# Patient Record
Sex: FEMALE | Race: WHITE | NOT HISPANIC OR LATINO | Employment: OTHER | URBAN - NONMETROPOLITAN AREA
[De-identification: names, ages, dates, MRNs, and addresses within clinical notes are randomized per-mention and may not be internally consistent; named-entity substitution may affect disease eponyms.]

---

## 2018-08-06 ENCOUNTER — PREP FOR SURGERY (OUTPATIENT)
Dept: OTHER | Facility: HOSPITAL | Age: 78
End: 2018-08-06

## 2018-08-06 ENCOUNTER — OFFICE VISIT (OUTPATIENT)
Dept: CARDIOLOGY | Facility: CLINIC | Age: 78
End: 2018-08-06

## 2018-08-06 VITALS
WEIGHT: 117 LBS | HEART RATE: 71 BPM | SYSTOLIC BLOOD PRESSURE: 160 MMHG | OXYGEN SATURATION: 98 % | HEIGHT: 64 IN | BODY MASS INDEX: 19.97 KG/M2 | DIASTOLIC BLOOD PRESSURE: 78 MMHG

## 2018-08-06 DIAGNOSIS — I20.9 ANGINA, CLASS III (HCC): Primary | ICD-10-CM

## 2018-08-06 DIAGNOSIS — I10 ESSENTIAL HYPERTENSION: ICD-10-CM

## 2018-08-06 DIAGNOSIS — E78.2 MIXED HYPERLIPIDEMIA: ICD-10-CM

## 2018-08-06 DIAGNOSIS — I25.700 CORONARY ARTERY DISEASE INVOLVING CORONARY BYPASS GRAFT OF NATIVE HEART WITH UNSTABLE ANGINA PECTORIS (HCC): Primary | ICD-10-CM

## 2018-08-06 PROCEDURE — 93000 ELECTROCARDIOGRAM COMPLETE: CPT | Performed by: INTERNAL MEDICINE

## 2018-08-06 PROCEDURE — 99204 OFFICE O/P NEW MOD 45 MIN: CPT | Performed by: INTERNAL MEDICINE

## 2018-08-06 RX ORDER — ALPRAZOLAM 0.25 MG/1
0.25 TABLET ORAL 4 TIMES DAILY
COMMUNITY

## 2018-08-06 RX ORDER — LEVALBUTEROL TARTRATE 45 UG/1
1-2 AEROSOL, METERED ORAL EVERY 4 HOURS PRN
COMMUNITY

## 2018-08-06 RX ORDER — PRAVASTATIN SODIUM 80 MG/1
80 TABLET ORAL DAILY
COMMUNITY

## 2018-08-06 RX ORDER — ALENDRONATE SODIUM 70 MG/1
70 TABLET ORAL
Status: ON HOLD | COMMUNITY
End: 2018-08-16

## 2018-08-06 RX ORDER — GABAPENTIN 600 MG/1
600 TABLET ORAL 3 TIMES DAILY
Status: ON HOLD | COMMUNITY
End: 2018-08-16

## 2018-08-06 RX ORDER — SODIUM CHLORIDE 9 MG/ML
3 INJECTION, SOLUTION INTRAVENOUS ONCE
Status: CANCELLED | OUTPATIENT
Start: 2018-08-06 | End: 2018-08-06

## 2018-08-06 RX ORDER — NITROGLYCERIN 0.4 MG/1
0.4 TABLET SUBLINGUAL
COMMUNITY

## 2018-08-06 RX ORDER — ATORVASTATIN CALCIUM 40 MG/1
40 TABLET, FILM COATED ORAL DAILY
Status: ON HOLD | COMMUNITY
End: 2018-08-16

## 2018-08-06 RX ORDER — CLOPIDOGREL BISULFATE 75 MG/1
75 TABLET ORAL DAILY
COMMUNITY

## 2018-08-06 RX ORDER — MELATONIN
1000 DAILY
COMMUNITY

## 2018-08-06 RX ORDER — DIPHENHYDRAMINE HCL 25 MG
25 CAPSULE ORAL ONCE
Status: CANCELLED | OUTPATIENT
Start: 2018-08-06 | End: 2018-08-06

## 2018-08-06 RX ORDER — LIDOCAINE HYDROCHLORIDE 10 MG/ML
0.1 INJECTION, SOLUTION EPIDURAL; INFILTRATION; INTRACAUDAL; PERINEURAL ONCE AS NEEDED
Status: CANCELLED | OUTPATIENT
Start: 2018-08-06

## 2018-08-06 RX ORDER — ASPIRIN 81 MG/1
81 TABLET ORAL DAILY
COMMUNITY

## 2018-08-06 RX ORDER — CARVEDILOL 12.5 MG/1
12.5 TABLET ORAL 2 TIMES DAILY WITH MEALS
COMMUNITY

## 2018-08-06 NOTE — PROGRESS NOTES
Referring Provider: Michel Piper MD    Reason for Consultation: CAD    Chief complaint:   Chief Complaint   Patient presents with   • New/Old patient     referred back by Dr. Piper for evaluaiton of a abnormal stress test and the possibility of a heart cath.  pt was seen here back in 1998 -2010.   • Coronary Artery Disease     complaining of some chest that feels like an elephant sitting on chest, hands went numb and pain in the jaw.  started june 20.   • Chest Pain     as decribed above.  had a nuclear test that was abnormal.   • Fatigue     tired all the time   • Shortness of Breath     with walking       Subjective .     History of present illness:  Loraine Alegria is a 77 y.o. yo female with history of CAD, s/p CABG in 1998 followed by stent placement in 2010. Now with chest pain and abnormal stress test who presents today for further evaluation.  Chief Complaint   Patient presents with   • New/Old patient     referred back by Dr. Piper for evaluaiton of a abnormal stress test and the possibility of a heart cath.  pt was seen here back in 1998 -2010.   • Coronary Artery Disease     complaining of some chest that feels like an elephant sitting on chest, hands went numb and pain in the jaw.  started june 20.   • Chest Pain     as decribed above.  had a nuclear test that was abnormal.   • Fatigue     tired all the time   • Shortness of Breath     with walking   .    History  Past Medical History:   Diagnosis Date   • Abnormal stress test    • Anemia    • Angina at rest (CMS/HCC)    • Anxiety    • CAD (coronary artery disease)    • COPD (chronic obstructive pulmonary disease) (CMS/HCC)    • DJD (degenerative joint disease)    • YAHIR (generalized anxiety disorder)    • Hyperlipidemia    • Hypertension    • Insomnia    • Myocardial infarction    • Neuropathy    • Osteoporosis    • Vitamin B 12 deficiency    ,   Past Surgical History:   Procedure Laterality Date   • ANKLE FUSION     • CHOLECYSTECTOMY     • CORONARY  ANGIOPLASTY WITH STENT PLACEMENT     • CORONARY ARTERY BYPASS GRAFT     • KNEE SURGERY     • TONSILLECTOMY     ,   Family History   Problem Relation Age of Onset   • No Known Problems Mother    • No Known Problems Father    • No Known Problems Maternal Grandmother    • No Known Problems Maternal Grandfather    • No Known Problems Paternal Grandmother    • No Known Problems Paternal Grandfather    ,   Social History   Substance Use Topics   • Smoking status: Former Smoker     Start date: 1955     Quit date: 1998   • Smokeless tobacco: Never Used   • Alcohol use No   ,     Medications  Current Outpatient Prescriptions   Medication Sig Dispense Refill   • alendronate (FOSAMAX) 70 MG tablet Take 70 mg by mouth Every 7 (Seven) Days.     • ALPRAZolam (XANAX) 0.25 MG tablet Take 0.25 mg by mouth 3 (Three) Times a Day As Needed for Anxiety.     • aspirin 81 MG EC tablet Take 81 mg by mouth Daily.     • atorvastatin (LIPITOR) 40 MG tablet Take 40 mg by mouth Daily.     • carvedilol (COREG) 12.5 MG tablet Take 12.5 mg by mouth 2 (Two) Times a Day With Meals.     • cholecalciferol (VITAMIN D3) 1000 units tablet Take 1,000 Units by mouth Daily.     • clopidogrel (PLAVIX) 75 MG tablet Take 75 mg by mouth Daily.     • gabapentin (NEURONTIN) 600 MG tablet Take 600 mg by mouth 3 (Three) Times a Day.     • levalbuterol (XOPENEX HFA) 45 MCG/ACT inhaler Inhale 1-2 puffs Every 4 (Four) Hours As Needed for Wheezing.     • Misc Natural Products (OSTEO BI-FLEX JOINT SHIELD PO) Take  by mouth.     • nitroglycerin (NITROSTAT) 0.4 MG SL tablet Place 0.4 mg under the tongue Every 5 (Five) Minutes As Needed for Chest Pain. Take no more than 3 doses in 15 minutes.     • Omega-3 Fatty Acids (FISH OIL) 1200 MG capsule delayed-release Take  by mouth Daily.     • pravastatin (PRAVACHOL) 80 MG tablet Take 80 mg by mouth Daily.     • TRIAMTERENE PO Take 37.5 mg by mouth 3 (Three) Times a Week.       No current facility-administered medications for  "this visit.         Allergies:  Codeine; Ibuprofen; Lipitor [atorvastatin]; Sulfa antibiotics; Talwin [pentazocine]; Levaquin [levofloxacin]; Empirin [aspirin]; and Penicillins    Review of Systems  Review of Systems   HENT: Negative for nosebleeds.    Cardiovascular: Positive for chest pain and dyspnea on exertion. Negative for claudication, irregular heartbeat, leg swelling, near-syncope, orthopnea, palpitations, paroxysmal nocturnal dyspnea and syncope.   Respiratory: Negative for cough, hemoptysis and shortness of breath.    Gastrointestinal: Negative for dysphagia, hematemesis and melena.   Genitourinary: Negative for hematuria.   All other systems reviewed and are negative.      Objective     Physical Exam:  /78 (BP Location: Left arm, Patient Position: Sitting, Cuff Size: Adult)   Pulse 71   Ht 162.6 cm (64\")   Wt 53.1 kg (117 lb)   SpO2 98%   BMI 20.08 kg/m²   Physical Exam   Constitutional: She is oriented to person, place, and time. She appears well-developed and well-nourished. No distress.   HENT:   Head: Normocephalic and atraumatic.   Eyes: No scleral icterus.   Neck: Normal range of motion.   Cardiovascular: Normal rate, regular rhythm and normal heart sounds.  Exam reveals no gallop and no friction rub.    No murmur heard.  Pulmonary/Chest: Effort normal and breath sounds normal. No respiratory distress. She has no wheezes. She has no rales.   Abdominal: Soft. Bowel sounds are normal. She exhibits no distension. There is no tenderness.   Musculoskeletal: She exhibits no edema.   Neurological: She is alert and oriented to person, place, and time. No cranial nerve deficit.   Skin: Skin is warm and dry. She is not diaphoretic. No erythema.   Psychiatric: She has a normal mood and affect. Her behavior is normal.       Results Review:   I reviewed the patient's new clinical results.    ECG 12 Lead  Date/Time: 8/6/2018 11:36 AM  Performed by: YESI ESCOBEDO  Authorized by: YESI ESCOBEDO "   Comparison: compared with previous ECG   Similar to previous ECG  Rhythm: sinus rhythm  Rate: normal  ST Segments: ST segments normal  T Waves: T waves normal  QRS axis: normal  Clinical impression: abnormal ECG  Comments: Poor R wave progression            No results found for any previous visit.       Assessment/Plan   Loraine was seen today for new/old patient, coronary artery disease, chest pain, fatigue and shortness of breath.    Diagnoses and all orders for this visit:    Coronary artery disease involving coronary bypass graft of native heart with unstable angina pectoris (CMS/HCC), will schedule cath next week    Essential hypertension, normally runs low at home    Mixed hyperlipidemia, Patient's statin therapy is followed by PCP.      Other orders  -     ECG 12 Lead        Patient's Body mass index is 20.08 kg/m². BMI is within normal parameters. No follow-up required.

## 2018-08-16 ENCOUNTER — HOSPITAL ENCOUNTER (OUTPATIENT)
Facility: HOSPITAL | Age: 78
Setting detail: HOSPITAL OUTPATIENT SURGERY
Discharge: HOME | End: 2018-08-16
Attending: INTERNAL MEDICINE | Admitting: INTERNAL MEDICINE

## 2018-08-16 VITALS
BODY MASS INDEX: 19.63 KG/M2 | DIASTOLIC BLOOD PRESSURE: 59 MMHG | SYSTOLIC BLOOD PRESSURE: 97 MMHG | TEMPERATURE: 97.3 F | HEART RATE: 65 BPM | WEIGHT: 115 LBS | OXYGEN SATURATION: 99 % | RESPIRATION RATE: 13 BRPM | HEIGHT: 64 IN

## 2018-08-16 DIAGNOSIS — I20.9 ANGINA, CLASS III (HCC): ICD-10-CM

## 2018-08-16 LAB
ALBUMIN SERPL-MCNC: 4.4 G/DL (ref 3.5–5)
ALBUMIN/GLOB SERPL: 1.6 G/DL (ref 1.1–2.5)
ALP SERPL-CCNC: 66 U/L (ref 24–120)
ALT SERPL W P-5'-P-CCNC: 16 U/L (ref 0–54)
ANION GAP SERPL CALCULATED.3IONS-SCNC: 7 MMOL/L (ref 4–13)
AST SERPL-CCNC: 31 U/L (ref 7–45)
BASOPHILS # BLD AUTO: 0.02 10*3/MM3 (ref 0–0.2)
BASOPHILS NFR BLD AUTO: 0.3 % (ref 0–2)
BILIRUB SERPL-MCNC: 0.6 MG/DL (ref 0.1–1)
BUN BLD-MCNC: 22 MG/DL (ref 5–21)
BUN/CREAT SERPL: 29.7 (ref 7–25)
CALCIUM SPEC-SCNC: 9.3 MG/DL (ref 8.4–10.4)
CHLORIDE SERPL-SCNC: 102 MMOL/L (ref 98–110)
CO2 SERPL-SCNC: 33 MMOL/L (ref 24–31)
CREAT BLD-MCNC: 0.74 MG/DL (ref 0.5–1.4)
DEPRECATED RDW RBC AUTO: 43.2 FL (ref 40–54)
EOSINOPHIL # BLD AUTO: 0.07 10*3/MM3 (ref 0–0.7)
EOSINOPHIL NFR BLD AUTO: 1.2 % (ref 0–4)
ERYTHROCYTE [DISTWIDTH] IN BLOOD BY AUTOMATED COUNT: 12.7 % (ref 12–15)
GFR SERPL CREATININE-BSD FRML MDRD: 77 ML/MIN/1.73
GLOBULIN UR ELPH-MCNC: 2.8 GM/DL
GLUCOSE BLD-MCNC: 99 MG/DL (ref 70–100)
HCT VFR BLD AUTO: 43.3 % (ref 37–47)
HGB BLD-MCNC: 14.2 G/DL (ref 12–16)
IMM GRANULOCYTES # BLD: 0.01 10*3/MM3 (ref 0–0.03)
IMM GRANULOCYTES NFR BLD: 0.2 % (ref 0–5)
LYMPHOCYTES # BLD AUTO: 1.5 10*3/MM3 (ref 0.72–4.86)
LYMPHOCYTES NFR BLD AUTO: 25.2 % (ref 15–45)
MCH RBC QN AUTO: 30.4 PG (ref 28–32)
MCHC RBC AUTO-ENTMCNC: 32.8 G/DL (ref 33–36)
MCV RBC AUTO: 92.7 FL (ref 82–98)
MONOCYTES # BLD AUTO: 0.47 10*3/MM3 (ref 0.19–1.3)
MONOCYTES NFR BLD AUTO: 7.9 % (ref 4–12)
NEUTROPHILS # BLD AUTO: 3.88 10*3/MM3 (ref 1.87–8.4)
NEUTROPHILS NFR BLD AUTO: 65.2 % (ref 39–78)
NRBC BLD MANUAL-RTO: 0 /100 WBC (ref 0–0)
PLATELET # BLD AUTO: 246 10*3/MM3 (ref 130–400)
PMV BLD AUTO: 9.5 FL (ref 6–12)
POTASSIUM BLD-SCNC: 3.8 MMOL/L (ref 3.5–5.3)
PROT SERPL-MCNC: 7.2 G/DL (ref 6.3–8.7)
RBC # BLD AUTO: 4.67 10*6/MM3 (ref 4.2–5.4)
SODIUM BLD-SCNC: 142 MMOL/L (ref 135–145)
WBC NRBC COR # BLD: 5.95 10*3/MM3 (ref 4.8–10.8)

## 2018-08-16 PROCEDURE — 93459 L HRT ART/GRFT ANGIO: CPT | Performed by: INTERNAL MEDICINE

## 2018-08-16 PROCEDURE — C1760 CLOSURE DEV, VASC: HCPCS | Performed by: INTERNAL MEDICINE

## 2018-08-16 PROCEDURE — C1894 INTRO/SHEATH, NON-LASER: HCPCS | Performed by: INTERNAL MEDICINE

## 2018-08-16 PROCEDURE — 80053 COMPREHEN METABOLIC PANEL: CPT | Performed by: INTERNAL MEDICINE

## 2018-08-16 PROCEDURE — 25010000002 HEPARIN (PORCINE) PER 1000 UNITS: Performed by: INTERNAL MEDICINE

## 2018-08-16 PROCEDURE — 25010000002 IOPAMIDOL 61 % SOLUTION: Performed by: INTERNAL MEDICINE

## 2018-08-16 PROCEDURE — 85025 COMPLETE CBC W/AUTO DIFF WBC: CPT | Performed by: INTERNAL MEDICINE

## 2018-08-16 PROCEDURE — C1769 GUIDE WIRE: HCPCS | Performed by: INTERNAL MEDICINE

## 2018-08-16 PROCEDURE — 36251 INS CATH REN ART 1ST UNILAT: CPT | Performed by: INTERNAL MEDICINE

## 2018-08-16 PROCEDURE — 99152 MOD SED SAME PHYS/QHP 5/>YRS: CPT | Performed by: INTERNAL MEDICINE

## 2018-08-16 PROCEDURE — 25010000002 DIPHENHYDRAMINE PER 50 MG: Performed by: INTERNAL MEDICINE

## 2018-08-16 RX ORDER — SODIUM CHLORIDE 9 MG/ML
3 INJECTION, SOLUTION INTRAVENOUS ONCE
Status: COMPLETED | OUTPATIENT
Start: 2018-08-16 | End: 2018-08-16

## 2018-08-16 RX ORDER — LIDOCAINE HYDROCHLORIDE 20 MG/ML
INJECTION, SOLUTION INFILTRATION; PERINEURAL AS NEEDED
Status: DISCONTINUED | OUTPATIENT
Start: 2018-08-16 | End: 2018-08-16 | Stop reason: HOSPADM

## 2018-08-16 RX ORDER — DIPHENHYDRAMINE HYDROCHLORIDE 50 MG/ML
INJECTION INTRAMUSCULAR; INTRAVENOUS AS NEEDED
Status: DISCONTINUED | OUTPATIENT
Start: 2018-08-16 | End: 2018-08-16 | Stop reason: HOSPADM

## 2018-08-16 RX ORDER — DIPHENHYDRAMINE HCL 25 MG
25 CAPSULE ORAL ONCE
Status: DISCONTINUED | OUTPATIENT
Start: 2018-08-16 | End: 2018-08-16 | Stop reason: HOSPADM

## 2018-08-16 RX ORDER — HYDROCODONE BITARTRATE AND ACETAMINOPHEN 5; 325 MG/1; MG/1
1 TABLET ORAL EVERY 6 HOURS PRN
COMMUNITY
End: 2020-09-29 | Stop reason: HOSPADM

## 2018-08-16 RX ORDER — LIDOCAINE HYDROCHLORIDE 10 MG/ML
0.1 INJECTION, SOLUTION EPIDURAL; INFILTRATION; INTRACAUDAL; PERINEURAL ONCE AS NEEDED
Status: DISCONTINUED | OUTPATIENT
Start: 2018-08-16 | End: 2018-08-16 | Stop reason: HOSPADM

## 2018-08-16 RX ADMIN — SODIUM CHLORIDE 3 ML/KG/HR: 9 INJECTION, SOLUTION INTRAVENOUS at 08:03

## 2018-08-23 RX ORDER — PRAVASTATIN SODIUM 80 MG/1
80 TABLET ORAL DAILY
COMMUNITY

## 2018-08-23 RX ORDER — CALCIUM CARBONATE/VITAMIN D3 600 MG-10
TABLET ORAL
COMMUNITY

## 2018-08-28 ENCOUNTER — OFFICE VISIT (OUTPATIENT)
Dept: VASCULAR SURGERY | Age: 78
End: 2018-08-28
Payer: MEDICARE

## 2018-08-28 ENCOUNTER — HOSPITAL ENCOUNTER (OUTPATIENT)
Dept: ULTRASOUND IMAGING | Age: 78
Discharge: HOME | End: 2018-08-28
Payer: MEDICARE

## 2018-08-28 VITALS
TEMPERATURE: 97.6 F | HEART RATE: 81 BPM | DIASTOLIC BLOOD PRESSURE: 77 MMHG | OXYGEN SATURATION: 93 % | SYSTOLIC BLOOD PRESSURE: 113 MMHG

## 2018-08-28 DIAGNOSIS — I70.1 RENAL ARTERY STENOSIS (HCC): ICD-10-CM

## 2018-08-28 DIAGNOSIS — I70.1 RENAL ARTERY STENOSIS (HCC): Primary | ICD-10-CM

## 2018-08-28 PROCEDURE — G8427 DOCREV CUR MEDS BY ELIG CLIN: HCPCS | Performed by: NURSE PRACTITIONER

## 2018-08-28 PROCEDURE — 4040F PNEUMOC VAC/ADMIN/RCVD: CPT | Performed by: NURSE PRACTITIONER

## 2018-08-28 PROCEDURE — 93975 VASCULAR STUDY: CPT

## 2018-08-28 PROCEDURE — 3017F COLORECTAL CA SCREEN DOC REV: CPT | Performed by: NURSE PRACTITIONER

## 2018-08-28 PROCEDURE — 1090F PRES/ABSN URINE INCON ASSESS: CPT | Performed by: NURSE PRACTITIONER

## 2018-08-28 PROCEDURE — G8421 BMI NOT CALCULATED: HCPCS | Performed by: NURSE PRACTITIONER

## 2018-08-28 PROCEDURE — 1123F ACP DISCUSS/DSCN MKR DOCD: CPT | Performed by: NURSE PRACTITIONER

## 2018-08-28 PROCEDURE — 4004F PT TOBACCO SCREEN RCVD TLK: CPT | Performed by: NURSE PRACTITIONER

## 2018-08-28 PROCEDURE — 1101F PT FALLS ASSESS-DOCD LE1/YR: CPT | Performed by: NURSE PRACTITIONER

## 2018-08-28 PROCEDURE — 99203 OFFICE O/P NEW LOW 30 MIN: CPT | Performed by: NURSE PRACTITIONER

## 2018-08-28 PROCEDURE — G8400 PT W/DXA NO RESULTS DOC: HCPCS | Performed by: NURSE PRACTITIONER

## 2018-08-28 PROCEDURE — G8598 ASA/ANTIPLAT THER USED: HCPCS | Performed by: NURSE PRACTITIONER

## 2018-08-28 NOTE — PROGRESS NOTES
Patient Care Team:  Mickey Taylor MD as PCP - General (Cardiology)  Mickey Taylor MD as PCP - Cardiology (Cardiology)      History and Physical    She has a history of known renal artery stenosis for a duration of < 1 year. This was seen on her heart cath. She reports that she was told it was 85% blocked but that her insurance would not cover treating it and she was upset and comes here for a second opinion. She presents with unchanged hypertension. She is currently on 2 antihypertensive medications. Her current treatment includes ASA 81 mg po qd, clopidogrel 75 mg po qd. At present she reports symptoms of none. Risk factors for atherosclerosis reviewed with the patient include tobacco use, hyperlipidemia, coronary artery disease, hypertension, diabetes, peripheral occlusive disease, family history of ASO, ESRD, O2 use and COPD. Differential diagnosis for hypertension includes but is not limited to essential HTN aldosteronoma, pheochromocytoma, renal artery stenosis, aortic coarctation.      Lab Results   Component Value Date    BUN 17 01/20/2014     Lab Results   Component Value Date    CREATININE 0.8 01/20/2014         Shobha Villareal is a 78 y.o. female with the following history reviewed and recorded in [facility]:  Patient Active Problem List    Diagnosis Date Noted    Renal artery stenosis ([facility] [address] 08/31/2018    S/P laparoscopic cholecystectomy 02/18/2014    S/P hernia repair 02/18/2014     Current Outpatient Prescriptions   Medication Sig Dispense Refill    Omega 3 1200 MG CAPS Take by mouth      pravastatin (PRAVACHOL) 80 MG tablet Take 80 mg by mouth daily      alendronate (FOSAMAX) 70 MG tablet       ALPRAZolam (XANAX) 0.25 MG tablet       carvedilol (COREG) 12.5 MG tablet       clopidogrel (PLAVIX) 75 MG tablet       gabapentin (NEURONTIN) 600 MG tablet       HYDROcodone-acetaminophen (VICODIN) 5-500 MG per tablet       ibuprofen (ADVIL;MOTRIN) 800 MG tablet       simvastatin (ZOCOR) 40 significant fatigue. HENT - no significant rhinorrhea or epistaxis. No tinnitus or significant hearing loss. Eyes - no sudden vision change or amaurosis. Respiratory - no significant shortness of breath, wheezing, or stridor. No apnea, cough, or chest tightness associated with shortness of breath. Cardiovascular - no chest pain, syncope, or significant dizziness. No palpitations or significant leg swelling. No claudication. Gastrointestinal - no abdominal swelling or pain. No blood in stool. No severe constipation, diarrhea, nausea, or vomiting. Genitourinary - No difficulty urinating, dysuria, frequency, or urgency. No flank pain or hematuria. Musculoskeletal - no back pain, gait disturbance, or myalgia. Skin - no color change, rash, pallor, or new wound. Neurologic - no dizziness, facial asymmetry, or light headedness. No seizures. No speech difficulty or lateralizing weakness. Hematologic - no easy bruising or excessive bleeding. Psychiatric - no severe anxiety or nervousness. No confusion. All other review of systems are negative. Physical Exam    /77   Pulse 81   Temp 97.6 °F (36.4 °C) (Temporal)   SpO2 93%     Constitutional - well developed, well nourished. No diaphoresis or acute distress. HENT - head normocephalic. Right external ear canal appears normal.  Left external ear canal appears normal.  Septum appears midline. Eyes - conjunctiva normal.  EOMS normal.  No exudate. No icterus. Neck- ROM appears normal, no tracheal deviation. Cardiovascular - Regular rate and rhythm. Heart sounds are normal.  No murmur, rub, or gallop. Carotid pulses are 2+ to palpation bilaterally without bruit. Extremities - Radial and brachial pulses are 2+ to palpation bilaterally. Right femoral pulse: present 2+; Right popliteal pulse: absent Right DP: absent; Right PT absent; Left femoral pulse: present 2+; Left popliteal pulse: absent; Left DP: absent;  Left PT: absent No cyanosis, clubbing, or significant edema. No signs atheroembolic event. Pulmonary - effort appears normal.  No respiratory distress. Lungs - Breath sounds normal. No wheezes or rales. GI - Abdomen - soft, non tender, bowel sounds X 4 quadrants. No guarding or rebound tenderness. No distension or palpable mass. Genitourinary - deferred. Musculoskeletal - ROM appears normal.  No significant edema. Neurologic - alert and oriented X 3. Physiologic. Skin - warm, dry, and intact. No rash, erythema, or pallor. Psychiatric - mood, affect, and behavior appear normal.  Judgment and thought processes appear normal.    Cath report dictated as 85% left ostial renal artery stenosis    Options have been discussed with the patient including continued medical management. Patient has opted to proceed with continued medical management. Assessment    1. Renal artery stenosis (HCC)          Plan    Needs renal u/s -   1. . Normal sonogram of the kidneys. 2. There is some elevation of velocity ratios within the renal   arteries bilaterally. Overall the velocities are not greatly elevated   with the mid right renal artery and proximal left renal artery at   approximately 180 cm/s. This does result in elevation of the velocity   ratio with a relatively low velocity within the abdominal aorta of 40   cm/s. Given the velocity ratios and borderline elevation of peak   systolic velocity bilaterally follow-up with CT angiography should be   considered     Will get a copy of cath to look at artery before making recommendations  Continue antiplatelet therapy  Patient instructed to call with any sudden increase in blood pressure that can not be controlled or rapid change in renal function. Addendum - received films. Reviewed by Dr. Reshma Garrett and myself. Left ostial stenosis by our measurements is about 50%. Would not recommend intervention at th is point. We will repeat a renal u/s in 6 months.     Asa ec daily

## 2018-08-31 PROBLEM — I70.1 RENAL ARTERY STENOSIS (HCC): Status: ACTIVE | Noted: 2018-08-31

## 2018-09-07 ENCOUNTER — TELEPHONE (OUTPATIENT)
Dept: VASCULAR SURGERY | Age: 78
End: 2018-09-07

## 2018-09-07 NOTE — TELEPHONE ENCOUNTER
I left a vm for the pt to let her know per Po Client there was no significant blockage noted on her renal us, everything looks okay. We are still waiting for the disc from [facility]. Once it has been reviewed by the physician we will call the pt with further recommendation. Pt is aware via vm.

## 2018-09-19 ENCOUNTER — TELEPHONE (OUTPATIENT)
Dept: VASCULAR SURGERY | Age: 78
End: 2018-09-19

## 2019-04-29 ENCOUNTER — HOSPITAL ENCOUNTER (OUTPATIENT)
Dept: ULTRASOUND IMAGING | Age: 79
Discharge: HOME | End: 2019-04-29
Payer: MEDICARE

## 2019-04-29 DIAGNOSIS — I70.1 RENAL ARTERY STENOSIS (HCC): ICD-10-CM

## 2019-04-29 PROCEDURE — 93975 VASCULAR STUDY: CPT

## 2019-05-02 ENCOUNTER — TELEPHONE (OUTPATIENT)
Dept: VASCULAR SURGERY | Age: 79
End: 2019-05-02

## 2019-05-02 DIAGNOSIS — I70.1 RENAL ARTERY STENOSIS (HCC): Primary | ICD-10-CM

## 2019-05-02 NOTE — TELEPHONE ENCOUNTER
----- Message from BELGICA Rao sent at 5/1/2019  1:51 PM CDT -----  Please let her know Dr. Jacques Dick reviewed her study. He does not feel there is any change. Please ask if there are any changes in her blood pressure or her kidney function.   We should request last creatinine from Dr. Khris Cook

## 2019-05-02 NOTE — TELEPHONE ENCOUNTER
Called and spoke with MRs Jeferson Poole and provided the following information   Dr. Atilio Garay reviewed her study. He does not feel there is any change. And we will see her back in 1 year with renal u/s with doppler.  Mrs Jeferson Poole voiced understanding

## 2020-09-26 ENCOUNTER — APPOINTMENT (OUTPATIENT)
Dept: GENERAL RADIOLOGY | Facility: HOSPITAL | Age: 80
End: 2020-09-26

## 2020-09-26 ENCOUNTER — APPOINTMENT (OUTPATIENT)
Dept: CT IMAGING | Facility: HOSPITAL | Age: 80
End: 2020-09-26

## 2020-09-26 ENCOUNTER — HOSPITAL ENCOUNTER (INPATIENT)
Facility: HOSPITAL | Age: 80
LOS: 3 days | Discharge: HOME HEALTH | End: 2020-09-29
Attending: EMERGENCY MEDICINE | Admitting: INTERNAL MEDICINE

## 2020-09-26 DIAGNOSIS — Z78.9 DECREASED ACTIVITIES OF DAILY LIVING (ADL): ICD-10-CM

## 2020-09-26 DIAGNOSIS — S72.142A FRACTURE, INTERTROCHANTERIC, LEFT FEMUR, CLOSED, INITIAL ENCOUNTER (HCC): ICD-10-CM

## 2020-09-26 DIAGNOSIS — S72.145A CLOSED NONDISPLACED INTERTROCHANTERIC FRACTURE OF LEFT FEMUR, INITIAL ENCOUNTER (HCC): ICD-10-CM

## 2020-09-26 DIAGNOSIS — M25.552 ACUTE HIP PAIN, LEFT: Primary | ICD-10-CM

## 2020-09-26 DIAGNOSIS — Z74.09 IMPAIRED FUNCTIONAL MOBILITY, BALANCE, GAIT, AND ENDURANCE: ICD-10-CM

## 2020-09-26 PROBLEM — J44.9 COPD WITHOUT EXACERBATION (HCC): Status: ACTIVE | Noted: 2020-09-26

## 2020-09-26 PROBLEM — I10 BENIGN ESSENTIAL HTN: Status: ACTIVE | Noted: 2020-09-26

## 2020-09-26 PROBLEM — E78.5 HYPERLIPEMIA: Status: ACTIVE | Noted: 2020-09-26

## 2020-09-26 PROBLEM — I25.10 CAD (CORONARY ARTERY DISEASE): Status: ACTIVE | Noted: 2020-09-26

## 2020-09-26 LAB
ALBUMIN SERPL-MCNC: 4 G/DL (ref 3.5–5.2)
ALBUMIN/GLOB SERPL: 1.9 G/DL
ALP SERPL-CCNC: 91 U/L (ref 39–117)
ALT SERPL W P-5'-P-CCNC: 8 U/L (ref 1–33)
ANION GAP SERPL CALCULATED.3IONS-SCNC: 9 MMOL/L (ref 5–15)
APTT PPP: 29.2 SECONDS (ref 24.1–35)
AST SERPL-CCNC: 17 U/L (ref 1–32)
BASOPHILS # BLD AUTO: 0.02 10*3/MM3 (ref 0–0.2)
BASOPHILS NFR BLD AUTO: 0.3 % (ref 0–1.5)
BILIRUB SERPL-MCNC: 0.4 MG/DL (ref 0–1.2)
BUN SERPL-MCNC: 16 MG/DL (ref 8–23)
BUN/CREAT SERPL: 16.8 (ref 7–25)
CALCIUM SPEC-SCNC: 9.3 MG/DL (ref 8.6–10.5)
CHLORIDE SERPL-SCNC: 100 MMOL/L (ref 98–107)
CO2 SERPL-SCNC: 30 MMOL/L (ref 22–29)
CREAT SERPL-MCNC: 0.95 MG/DL (ref 0.57–1)
DEPRECATED RDW RBC AUTO: 40.4 FL (ref 37–54)
EOSINOPHIL # BLD AUTO: 0.07 10*3/MM3 (ref 0–0.4)
EOSINOPHIL NFR BLD AUTO: 1 % (ref 0.3–6.2)
ERYTHROCYTE [DISTWIDTH] IN BLOOD BY AUTOMATED COUNT: 12.2 % (ref 12.3–15.4)
GFR SERPL CREATININE-BSD FRML MDRD: 57 ML/MIN/1.73
GLOBULIN UR ELPH-MCNC: 2.1 GM/DL
GLUCOSE SERPL-MCNC: 110 MG/DL (ref 65–99)
HCT VFR BLD AUTO: 39.4 % (ref 34–46.6)
HGB BLD-MCNC: 13.6 G/DL (ref 12–15.9)
IMM GRANULOCYTES # BLD AUTO: 0.03 10*3/MM3 (ref 0–0.05)
IMM GRANULOCYTES NFR BLD AUTO: 0.4 % (ref 0–0.5)
INR PPP: 1 (ref 0.91–1.09)
LYMPHOCYTES # BLD AUTO: 1.55 10*3/MM3 (ref 0.7–3.1)
LYMPHOCYTES NFR BLD AUTO: 22.7 % (ref 19.6–45.3)
MCH RBC QN AUTO: 31.5 PG (ref 26.6–33)
MCHC RBC AUTO-ENTMCNC: 34.5 G/DL (ref 31.5–35.7)
MCV RBC AUTO: 91.2 FL (ref 79–97)
MONOCYTES # BLD AUTO: 0.51 10*3/MM3 (ref 0.1–0.9)
MONOCYTES NFR BLD AUTO: 7.5 % (ref 5–12)
NEUTROPHILS NFR BLD AUTO: 4.66 10*3/MM3 (ref 1.7–7)
NEUTROPHILS NFR BLD AUTO: 68.1 % (ref 42.7–76)
NRBC BLD AUTO-RTO: 0 /100 WBC (ref 0–0.2)
PLATELET # BLD AUTO: 225 10*3/MM3 (ref 140–450)
PMV BLD AUTO: 9.2 FL (ref 6–12)
POTASSIUM SERPL-SCNC: 3.6 MMOL/L (ref 3.5–5.2)
PROT SERPL-MCNC: 6.1 G/DL (ref 6–8.5)
PROTHROMBIN TIME: 12.8 SECONDS (ref 11.9–14.6)
RBC # BLD AUTO: 4.32 10*6/MM3 (ref 3.77–5.28)
SARS-COV-2 RDRP RESP QL NAA+PROBE: NOT DETECTED
SODIUM SERPL-SCNC: 139 MMOL/L (ref 136–145)
WBC # BLD AUTO: 6.84 10*3/MM3 (ref 3.4–10.8)

## 2020-09-26 PROCEDURE — 85610 PROTHROMBIN TIME: CPT | Performed by: EMERGENCY MEDICINE

## 2020-09-26 PROCEDURE — 85730 THROMBOPLASTIN TIME PARTIAL: CPT | Performed by: EMERGENCY MEDICINE

## 2020-09-26 PROCEDURE — 72125 CT NECK SPINE W/O DYE: CPT

## 2020-09-26 PROCEDURE — 85025 COMPLETE CBC W/AUTO DIFF WBC: CPT | Performed by: EMERGENCY MEDICINE

## 2020-09-26 PROCEDURE — 73502 X-RAY EXAM HIP UNI 2-3 VIEWS: CPT

## 2020-09-26 PROCEDURE — 73552 X-RAY EXAM OF FEMUR 2/>: CPT

## 2020-09-26 PROCEDURE — 70450 CT HEAD/BRAIN W/O DYE: CPT

## 2020-09-26 PROCEDURE — 80053 COMPREHEN METABOLIC PANEL: CPT | Performed by: EMERGENCY MEDICINE

## 2020-09-26 PROCEDURE — 25010000002 FENTANYL CITRATE (PF) 100 MCG/2ML SOLUTION: Performed by: EMERGENCY MEDICINE

## 2020-09-26 PROCEDURE — 87635 SARS-COV-2 COVID-19 AMP PRB: CPT | Performed by: EMERGENCY MEDICINE

## 2020-09-26 PROCEDURE — 71045 X-RAY EXAM CHEST 1 VIEW: CPT

## 2020-09-26 PROCEDURE — 99284 EMERGENCY DEPT VISIT MOD MDM: CPT

## 2020-09-26 PROCEDURE — 25010000002 ONDANSETRON PER 1 MG: Performed by: INTERNAL MEDICINE

## 2020-09-26 RX ORDER — NITROGLYCERIN 0.4 MG/1
0.4 TABLET SUBLINGUAL
Status: DISCONTINUED | OUTPATIENT
Start: 2020-09-26 | End: 2020-09-29 | Stop reason: HOSPADM

## 2020-09-26 RX ORDER — LABETALOL HYDROCHLORIDE 5 MG/ML
20 INJECTION, SOLUTION INTRAVENOUS EVERY 6 HOURS PRN
Status: DISCONTINUED | OUTPATIENT
Start: 2020-09-26 | End: 2020-09-29 | Stop reason: HOSPADM

## 2020-09-26 RX ORDER — SODIUM CHLORIDE 0.9 % (FLUSH) 0.9 %
10 SYRINGE (ML) INJECTION AS NEEDED
Status: DISCONTINUED | OUTPATIENT
Start: 2020-09-26 | End: 2020-09-29 | Stop reason: HOSPADM

## 2020-09-26 RX ORDER — HYDROCODONE BITARTRATE AND ACETAMINOPHEN 5; 325 MG/1; MG/1
1 TABLET ORAL EVERY 4 HOURS PRN
Status: DISCONTINUED | OUTPATIENT
Start: 2020-09-26 | End: 2020-09-29 | Stop reason: HOSPADM

## 2020-09-26 RX ORDER — GABAPENTIN 300 MG/1
300 CAPSULE ORAL EVERY 8 HOURS SCHEDULED
Status: DISCONTINUED | OUTPATIENT
Start: 2020-09-26 | End: 2020-09-29 | Stop reason: HOSPADM

## 2020-09-26 RX ORDER — ASPIRIN 81 MG/1
81 TABLET ORAL DAILY
Status: DISCONTINUED | OUTPATIENT
Start: 2020-09-27 | End: 2020-09-29 | Stop reason: HOSPADM

## 2020-09-26 RX ORDER — ALPRAZOLAM 0.25 MG/1
0.25 TABLET ORAL 3 TIMES DAILY PRN
Status: DISCONTINUED | OUTPATIENT
Start: 2020-09-26 | End: 2020-09-29 | Stop reason: HOSPADM

## 2020-09-26 RX ORDER — GABAPENTIN 600 MG/1
600 TABLET ORAL 3 TIMES DAILY
Status: ON HOLD | COMMUNITY
End: 2020-09-29 | Stop reason: SDUPTHER

## 2020-09-26 RX ORDER — CLOPIDOGREL BISULFATE 75 MG/1
75 TABLET ORAL DAILY
Status: DISCONTINUED | OUTPATIENT
Start: 2020-09-28 | End: 2020-09-29 | Stop reason: HOSPADM

## 2020-09-26 RX ORDER — CARVEDILOL 6.25 MG/1
12.5 TABLET ORAL 2 TIMES DAILY WITH MEALS
Status: DISCONTINUED | OUTPATIENT
Start: 2020-09-26 | End: 2020-09-29 | Stop reason: HOSPADM

## 2020-09-26 RX ORDER — TRIAMTERENE AND HYDROCHLOROTHIAZIDE 37.5; 25 MG/1; MG/1
1 TABLET ORAL DAILY
COMMUNITY
End: 2020-09-29 | Stop reason: HOSPADM

## 2020-09-26 RX ORDER — ONDANSETRON 2 MG/ML
4 INJECTION INTRAMUSCULAR; INTRAVENOUS EVERY 6 HOURS PRN
Status: DISCONTINUED | OUTPATIENT
Start: 2020-09-26 | End: 2020-09-29 | Stop reason: HOSPADM

## 2020-09-26 RX ORDER — ACETAMINOPHEN 325 MG/1
650 TABLET ORAL EVERY 4 HOURS PRN
Status: DISCONTINUED | OUTPATIENT
Start: 2020-09-26 | End: 2020-09-29 | Stop reason: HOSPADM

## 2020-09-26 RX ORDER — HYDROCODONE BITARTRATE AND ACETAMINOPHEN 5; 300 MG/1; MG/1
1 TABLET ORAL EVERY 6 HOURS PRN
COMMUNITY
End: 2020-09-29 | Stop reason: HOSPADM

## 2020-09-26 RX ORDER — ALBUTEROL SULFATE 2.5 MG/3ML
2.5 SOLUTION RESPIRATORY (INHALATION) EVERY 6 HOURS PRN
Status: DISCONTINUED | OUTPATIENT
Start: 2020-09-26 | End: 2020-09-29 | Stop reason: HOSPADM

## 2020-09-26 RX ORDER — PRAVASTATIN SODIUM 20 MG
80 TABLET ORAL NIGHTLY
Status: DISCONTINUED | OUTPATIENT
Start: 2020-09-26 | End: 2020-09-29 | Stop reason: HOSPADM

## 2020-09-26 RX ORDER — FENTANYL CITRATE 50 UG/ML
25 INJECTION, SOLUTION INTRAMUSCULAR; INTRAVENOUS
Status: DISCONTINUED | OUTPATIENT
Start: 2020-09-26 | End: 2020-09-26

## 2020-09-26 RX ORDER — SODIUM CHLORIDE 0.9 % (FLUSH) 0.9 %
10 SYRINGE (ML) INJECTION EVERY 12 HOURS SCHEDULED
Status: DISCONTINUED | OUTPATIENT
Start: 2020-09-26 | End: 2020-09-29 | Stop reason: HOSPADM

## 2020-09-26 RX ORDER — FENTANYL CITRATE 50 UG/ML
1 INJECTION, SOLUTION INTRAMUSCULAR; INTRAVENOUS ONCE
Status: COMPLETED | OUTPATIENT
Start: 2020-09-26 | End: 2020-09-26

## 2020-09-26 RX ADMIN — CARVEDILOL 12.5 MG: 6.25 TABLET, FILM COATED ORAL at 17:58

## 2020-09-26 RX ADMIN — ALPRAZOLAM 0.25 MG: 0.5 TABLET ORAL at 20:33

## 2020-09-26 RX ADMIN — GABAPENTIN 300 MG: 100 CAPSULE ORAL at 17:58

## 2020-09-26 RX ADMIN — HYDROCODONE BITARTRATE AND ACETAMINOPHEN 1 TABLET: 5; 325 TABLET ORAL at 18:10

## 2020-09-26 RX ADMIN — FENTANYL CITRATE 47 MCG: 50 INJECTION, SOLUTION INTRAMUSCULAR; INTRAVENOUS at 15:02

## 2020-09-26 RX ADMIN — PRAVASTATIN SODIUM 80 MG: 20 TABLET ORAL at 20:32

## 2020-09-26 RX ADMIN — GABAPENTIN 300 MG: 100 CAPSULE ORAL at 20:33

## 2020-09-26 RX ADMIN — ONDANSETRON HYDROCHLORIDE 4 MG: 2 SOLUTION INTRAMUSCULAR; INTRAVENOUS at 17:58

## 2020-09-27 ENCOUNTER — ANESTHESIA EVENT (OUTPATIENT)
Dept: PERIOP | Facility: HOSPITAL | Age: 80
End: 2020-09-27

## 2020-09-27 ENCOUNTER — ANESTHESIA (OUTPATIENT)
Dept: PERIOP | Facility: HOSPITAL | Age: 80
End: 2020-09-27

## 2020-09-27 ENCOUNTER — APPOINTMENT (OUTPATIENT)
Dept: GENERAL RADIOLOGY | Facility: HOSPITAL | Age: 80
End: 2020-09-27

## 2020-09-27 PROBLEM — E44.0 MODERATE MALNUTRITION (HCC): Status: ACTIVE | Noted: 2020-09-27

## 2020-09-27 PROBLEM — M25.552 ACUTE HIP PAIN, LEFT: Status: RESOLVED | Noted: 2020-09-26 | Resolved: 2020-09-27

## 2020-09-27 PROCEDURE — C1713 ANCHOR/SCREW BN/BN,TIS/BN: HCPCS | Performed by: ORTHOPAEDIC SURGERY

## 2020-09-27 PROCEDURE — 25010000002 CEFAZOLIN PER 500 MG: Performed by: ORTHOPAEDIC SURGERY

## 2020-09-27 PROCEDURE — 25010000002 PHENYLEPHRINE HCL 0.8 MG/10ML SOLUTION PREFILLED SYRINGE: Performed by: NURSE ANESTHETIST, CERTIFIED REGISTERED

## 2020-09-27 PROCEDURE — 94799 UNLISTED PULMONARY SVC/PX: CPT

## 2020-09-27 PROCEDURE — 0QS736Z REPOSITION LEFT UPPER FEMUR WITH INTRAMEDULLARY INTERNAL FIXATION DEVICE, PERCUTANEOUS APPROACH: ICD-10-PCS | Performed by: ORTHOPAEDIC SURGERY

## 2020-09-27 PROCEDURE — 25010000002 PROPOFOL 10 MG/ML EMULSION: Performed by: NURSE ANESTHETIST, CERTIFIED REGISTERED

## 2020-09-27 PROCEDURE — 76000 FLUOROSCOPY <1 HR PHYS/QHP: CPT

## 2020-09-27 PROCEDURE — 25010000002 ONDANSETRON PER 1 MG: Performed by: NURSE ANESTHETIST, CERTIFIED REGISTERED

## 2020-09-27 PROCEDURE — 73502 X-RAY EXAM HIP UNI 2-3 VIEWS: CPT

## 2020-09-27 PROCEDURE — C1769 GUIDE WIRE: HCPCS | Performed by: ORTHOPAEDIC SURGERY

## 2020-09-27 PROCEDURE — 25010000002 FENTANYL CITRATE (PF) 100 MCG/2ML SOLUTION: Performed by: NURSE ANESTHETIST, CERTIFIED REGISTERED

## 2020-09-27 PROCEDURE — 25010000002 PROMETHAZINE PER 50 MG: Performed by: ORTHOPAEDIC SURGERY

## 2020-09-27 PROCEDURE — 25010000002 CEFAZOLIN PER 500 MG: Performed by: NURSE ANESTHETIST, CERTIFIED REGISTERED

## 2020-09-27 DEVICE — SCRW LK STRDRV TI 5X34MM STRL: Type: IMPLANTABLE DEVICE | Site: HIP | Status: FUNCTIONAL

## 2020-09-27 DEVICE — BLD FEM FIX HELI TFN ADV PERF 95MM STRL: Type: IMPLANTABLE DEVICE | Site: HIP | Status: FUNCTIONAL

## 2020-09-27 DEVICE — NAIL FEM TFN ADV PROX 130D SHT 11X170MM STRL: Type: IMPLANTABLE DEVICE | Site: HIP | Status: FUNCTIONAL

## 2020-09-27 RX ORDER — OXYCODONE AND ACETAMINOPHEN 7.5; 325 MG/1; MG/1
2 TABLET ORAL ONCE AS NEEDED
Status: DISCONTINUED | OUTPATIENT
Start: 2020-09-27 | End: 2020-09-27

## 2020-09-27 RX ORDER — CEFAZOLIN SODIUM 500 MG/2.2ML
INJECTION, POWDER, FOR SOLUTION INTRAMUSCULAR; INTRAVENOUS AS NEEDED
Status: DISCONTINUED | OUTPATIENT
Start: 2020-09-27 | End: 2020-09-27 | Stop reason: SURG

## 2020-09-27 RX ORDER — SODIUM CHLORIDE 9 MG/ML
INJECTION, SOLUTION INTRAVENOUS CONTINUOUS PRN
Status: DISCONTINUED | OUTPATIENT
Start: 2020-09-27 | End: 2020-09-27 | Stop reason: SURG

## 2020-09-27 RX ORDER — EPHEDRINE SULFATE 50 MG/ML
INJECTION, SOLUTION INTRAVENOUS AS NEEDED
Status: DISCONTINUED | OUTPATIENT
Start: 2020-09-27 | End: 2020-09-27 | Stop reason: SURG

## 2020-09-27 RX ORDER — OXYCODONE AND ACETAMINOPHEN 10; 325 MG/1; MG/1
1 TABLET ORAL ONCE AS NEEDED
Status: DISCONTINUED | OUTPATIENT
Start: 2020-09-27 | End: 2020-09-27

## 2020-09-27 RX ORDER — PROPOFOL 10 MG/ML
VIAL (ML) INTRAVENOUS AS NEEDED
Status: DISCONTINUED | OUTPATIENT
Start: 2020-09-27 | End: 2020-09-27 | Stop reason: SURG

## 2020-09-27 RX ORDER — FLUMAZENIL 0.1 MG/ML
0.2 INJECTION INTRAVENOUS AS NEEDED
Status: DISCONTINUED | OUTPATIENT
Start: 2020-09-27 | End: 2020-09-27 | Stop reason: HOSPADM

## 2020-09-27 RX ORDER — FENTANYL CITRATE 50 UG/ML
25 INJECTION, SOLUTION INTRAMUSCULAR; INTRAVENOUS
Status: DISCONTINUED | OUTPATIENT
Start: 2020-09-27 | End: 2020-09-27 | Stop reason: HOSPADM

## 2020-09-27 RX ORDER — NEOSTIGMINE METHYLSULFATE 5 MG/5 ML
SYRINGE (ML) INTRAVENOUS AS NEEDED
Status: DISCONTINUED | OUTPATIENT
Start: 2020-09-27 | End: 2020-09-27 | Stop reason: SURG

## 2020-09-27 RX ORDER — MAGNESIUM HYDROXIDE 1200 MG/15ML
LIQUID ORAL AS NEEDED
Status: DISCONTINUED | OUTPATIENT
Start: 2020-09-27 | End: 2020-09-27 | Stop reason: HOSPADM

## 2020-09-27 RX ORDER — MORPHINE SULFATE 2 MG/ML
2 INJECTION, SOLUTION INTRAMUSCULAR; INTRAVENOUS
Status: DISCONTINUED | OUTPATIENT
Start: 2020-09-27 | End: 2020-09-27

## 2020-09-27 RX ORDER — ONDANSETRON 2 MG/ML
INJECTION INTRAMUSCULAR; INTRAVENOUS AS NEEDED
Status: DISCONTINUED | OUTPATIENT
Start: 2020-09-27 | End: 2020-09-27 | Stop reason: SURG

## 2020-09-27 RX ORDER — PHENYLEPHRINE HCL IN 0.9% NACL 0.8MG/10ML
SYRINGE (ML) INTRAVENOUS AS NEEDED
Status: DISCONTINUED | OUTPATIENT
Start: 2020-09-27 | End: 2020-09-27 | Stop reason: SURG

## 2020-09-27 RX ORDER — ONDANSETRON 2 MG/ML
4 INJECTION INTRAMUSCULAR; INTRAVENOUS AS NEEDED
Status: DISCONTINUED | OUTPATIENT
Start: 2020-09-27 | End: 2020-09-27 | Stop reason: HOSPADM

## 2020-09-27 RX ORDER — BUPIVACAINE HCL/0.9 % NACL/PF 0.1 %
2 PLASTIC BAG, INJECTION (ML) EPIDURAL EVERY 8 HOURS
Status: COMPLETED | OUTPATIENT
Start: 2020-09-27 | End: 2020-09-28

## 2020-09-27 RX ORDER — FENTANYL CITRATE 50 UG/ML
INJECTION, SOLUTION INTRAMUSCULAR; INTRAVENOUS AS NEEDED
Status: DISCONTINUED | OUTPATIENT
Start: 2020-09-27 | End: 2020-09-27 | Stop reason: SURG

## 2020-09-27 RX ORDER — NALOXONE HCL 0.4 MG/ML
0.04 VIAL (ML) INJECTION AS NEEDED
Status: DISCONTINUED | OUTPATIENT
Start: 2020-09-27 | End: 2020-09-27 | Stop reason: HOSPADM

## 2020-09-27 RX ORDER — ROCURONIUM BROMIDE 10 MG/ML
INJECTION, SOLUTION INTRAVENOUS AS NEEDED
Status: DISCONTINUED | OUTPATIENT
Start: 2020-09-27 | End: 2020-09-27 | Stop reason: SURG

## 2020-09-27 RX ORDER — LABETALOL HYDROCHLORIDE 5 MG/ML
5 INJECTION, SOLUTION INTRAVENOUS
Status: DISCONTINUED | OUTPATIENT
Start: 2020-09-27 | End: 2020-09-27 | Stop reason: HOSPADM

## 2020-09-27 RX ADMIN — SODIUM CHLORIDE, PRESERVATIVE FREE 10 ML: 5 INJECTION INTRAVENOUS at 21:26

## 2020-09-27 RX ADMIN — HYDROCODONE BITARTRATE AND ACETAMINOPHEN 1 TABLET: 5; 325 TABLET ORAL at 21:25

## 2020-09-27 RX ADMIN — Medication 320 MCG: at 07:47

## 2020-09-27 RX ADMIN — CARVEDILOL 12.5 MG: 6.25 TABLET, FILM COATED ORAL at 17:42

## 2020-09-27 RX ADMIN — PRAVASTATIN SODIUM 80 MG: 20 TABLET ORAL at 21:25

## 2020-09-27 RX ADMIN — ACETAMINOPHEN 650 MG: 325 TABLET, FILM COATED ORAL at 03:34

## 2020-09-27 RX ADMIN — FENTANYL CITRATE 100 MCG: 50 INJECTION, SOLUTION INTRAMUSCULAR; INTRAVENOUS at 07:41

## 2020-09-27 RX ADMIN — GABAPENTIN 300 MG: 100 CAPSULE ORAL at 21:25

## 2020-09-27 RX ADMIN — ROCURONIUM BROMIDE 30 MG: 10 INJECTION INTRAVENOUS at 07:41

## 2020-09-27 RX ADMIN — CEFAZOLIN SODIUM 2 G: 10 INJECTION, POWDER, FOR SOLUTION INTRAVENOUS at 23:31

## 2020-09-27 RX ADMIN — CEFAZOLIN SODIUM 2 G: 10 INJECTION, POWDER, FOR SOLUTION INTRAVENOUS at 15:48

## 2020-09-27 RX ADMIN — Medication 160 MCG: at 07:58

## 2020-09-27 RX ADMIN — ALPRAZOLAM 0.25 MG: 0.5 TABLET ORAL at 21:25

## 2020-09-27 RX ADMIN — EPHEDRINE SULFATE 25 MG: 50 INJECTION INTRAVENOUS at 08:02

## 2020-09-27 RX ADMIN — HYDROCODONE BITARTRATE AND ACETAMINOPHEN 1 TABLET: 5; 325 TABLET ORAL at 06:13

## 2020-09-27 RX ADMIN — LIDOCAINE HYDROCHLORIDE 60 MG: 20 INJECTION, SOLUTION INTRAVENOUS at 07:41

## 2020-09-27 RX ADMIN — CEFAZOLIN 1 G: 500 INJECTION, POWDER, FOR SOLUTION INTRAMUSCULAR; INTRAVENOUS at 07:41

## 2020-09-27 RX ADMIN — HYDROCODONE BITARTRATE AND ACETAMINOPHEN 1 TABLET: 5; 325 TABLET ORAL at 00:53

## 2020-09-27 RX ADMIN — ONDANSETRON HYDROCHLORIDE 4 MG: 2 SOLUTION INTRAMUSCULAR; INTRAVENOUS at 08:20

## 2020-09-27 RX ADMIN — GLYCOPYRROLATE 0.4 MG: 0.2 INJECTION, SOLUTION INTRAMUSCULAR; INTRAVENOUS at 08:20

## 2020-09-27 RX ADMIN — PROMETHAZINE HYDROCHLORIDE 25 MG: 25 INJECTION INTRAMUSCULAR; INTRAVENOUS at 10:33

## 2020-09-27 RX ADMIN — SODIUM CHLORIDE: 9 INJECTION, SOLUTION INTRAVENOUS at 07:37

## 2020-09-27 RX ADMIN — Medication 160 MCG: at 07:52

## 2020-09-27 RX ADMIN — GABAPENTIN 300 MG: 100 CAPSULE ORAL at 14:12

## 2020-09-27 RX ADMIN — PROPOFOL 100 MG: 10 INJECTION, EMULSION INTRAVENOUS at 07:41

## 2020-09-27 RX ADMIN — Medication 3 MG: at 08:20

## 2020-09-27 RX ADMIN — ONDANSETRON HYDROCHLORIDE 4 MG: 2 SOLUTION INTRAMUSCULAR; INTRAVENOUS at 09:13

## 2020-09-27 RX ADMIN — HYDROCODONE BITARTRATE AND ACETAMINOPHEN 1 TABLET: 5; 325 TABLET ORAL at 17:42

## 2020-09-27 NOTE — ANESTHESIA PREPROCEDURE EVALUATION
Anesthesia Evaluation     Patient summary reviewed   history of anesthetic complications: PONV  NPO Solid Status: > 8 hours  NPO Liquid Status: > 8 hours           Airway   Mallampati: I  TM distance: >3 FB  Neck ROM: full  No difficulty expected  Dental    (+) edentulous    Pulmonary - normal exam   (+) COPD moderate, home oxygen, shortness of breath,   Cardiovascular - normal exam  Exercise tolerance: good (4-7 METS)    (+) hypertension well controlled less than 2 medications, past MI  >12 months, CAD, CABG >6 Months, cardiac stents Bare metal stent more than 12 months ago angina with exertion, hyperlipidemia,       Neuro/Psych  (+) psychiatric history Anxiety,     GI/Hepatic/Renal/Endo    (+)   renal disease ESRD,     Musculoskeletal     Abdominal  - normal exam   Substance History - negative use     OB/GYN          Other   arthritis,    history of cancer remission                    Anesthesia Plan    ASA 3     intravenous induction     Anesthetic plan, all risks, benefits, and alternatives have been provided, discussed and informed consent has been obtained with: patient.    Plan discussed with CRNA.

## 2020-09-27 NOTE — ANESTHESIA PROCEDURE NOTES
Airway  Urgency: elective    Date/Time: 9/27/2020 7:44 AM  Airway not difficult    General Information and Staff    Patient location during procedure: OR  CRNA: Pk Chang CRNA    Indications and Patient Condition  Indications for airway management: airway protection    Preoxygenated: yes  Mask difficulty assessment: 1 - vent by mask    Final Airway Details  Final airway type: endotracheal airway      Successful airway: ETT    Successful intubation technique: direct laryngoscopy  Endotracheal tube insertion site: oral  Blade: Shahrzad  Blade size: 3.5.  ETT size (mm): 7.5  Cormack-Lehane Classification: grade I - full view of glottis  Placement verified by: chest auscultation and capnometry   Measured from: gums  Number of attempts at approach: 1  Assessment: lips, teeth, and gum same as pre-op and atraumatic intubation

## 2020-09-27 NOTE — ANESTHESIA POSTPROCEDURE EVALUATION
"Patient: Loraine Alegria    Procedure Summary     Date: 09/27/20 Room / Location: [facility] OR  /  [facility] OR    Anesthesia Start: 0737 Anesthesia Stop: 0844    Procedure: HIP TROCHANTERIC NAILING SHORT WITH INTRAMEDULLARY HIP SCREW (Left Hip) Diagnosis: (Left Hip Fracture)    Surgeon: Edy Miguel MD Provider: Pk Chang CRNA    Anesthesia Type: Not recorded ASA Status: 3          Anesthesia Type: No value filed.    Vitals  Vitals Value Taken Time   /82 09/27/20 0916   Temp 97.2 °F (36.2 °C) 09/27/20 0915   Pulse 64 09/27/20 0920   Resp 14 09/27/20 0915   SpO2 98 % 09/27/20 0920   Vitals shown include unvalidated device data.        Post Anesthesia Care and Evaluation    PONV Status: none  Comments: Patient d/c from PACU prior to anes eval based on Dinora score.  Please see RN notes for details of d/c criteria.    Blood pressure 92/45, pulse 52, temperature 97.4 °F (36.3 °C), temperature source Axillary, resp. rate 16, height 162.6 cm (64.02\"), weight 47.2 kg (104 lb 0.9 oz), SpO2 100 %, not currently breastfeeding.          "

## 2020-09-28 LAB
ANION GAP SERPL CALCULATED.3IONS-SCNC: 8 MMOL/L (ref 5–15)
BUN SERPL-MCNC: 19 MG/DL (ref 8–23)
BUN/CREAT SERPL: 22.1 (ref 7–25)
CALCIUM SPEC-SCNC: 8.6 MG/DL (ref 8.6–10.5)
CHLORIDE SERPL-SCNC: 100 MMOL/L (ref 98–107)
CO2 SERPL-SCNC: 31 MMOL/L (ref 22–29)
CREAT SERPL-MCNC: 0.86 MG/DL (ref 0.57–1)
DEPRECATED RDW RBC AUTO: 41 FL (ref 37–54)
ERYTHROCYTE [DISTWIDTH] IN BLOOD BY AUTOMATED COUNT: 12.2 % (ref 12.3–15.4)
GFR SERPL CREATININE-BSD FRML MDRD: 64 ML/MIN/1.73
GLUCOSE SERPL-MCNC: 92 MG/DL (ref 65–99)
HCT VFR BLD AUTO: 27.8 % (ref 34–46.6)
HGB BLD-MCNC: 9.3 G/DL (ref 12–15.9)
IRON 24H UR-MRATE: 47 MCG/DL (ref 37–145)
IRON SATN MFR SERPL: 22 % (ref 20–50)
MCH RBC QN AUTO: 30.9 PG (ref 26.6–33)
MCHC RBC AUTO-ENTMCNC: 33.5 G/DL (ref 31.5–35.7)
MCV RBC AUTO: 92.4 FL (ref 79–97)
PLATELET # BLD AUTO: 163 10*3/MM3 (ref 140–450)
PMV BLD AUTO: 9.9 FL (ref 6–12)
POTASSIUM SERPL-SCNC: 4.1 MMOL/L (ref 3.5–5.2)
RBC # BLD AUTO: 3.01 10*6/MM3 (ref 3.77–5.28)
SODIUM SERPL-SCNC: 139 MMOL/L (ref 136–145)
TIBC SERPL-MCNC: 216 MCG/DL (ref 298–536)
TRANSFERRIN SERPL-MCNC: 145 MG/DL (ref 200–360)
WBC # BLD AUTO: 7.27 10*3/MM3 (ref 3.4–10.8)

## 2020-09-28 PROCEDURE — 80048 BASIC METABOLIC PNL TOTAL CA: CPT | Performed by: INTERNAL MEDICINE

## 2020-09-28 PROCEDURE — 83540 ASSAY OF IRON: CPT | Performed by: INTERNAL MEDICINE

## 2020-09-28 PROCEDURE — 97116 GAIT TRAINING THERAPY: CPT

## 2020-09-28 PROCEDURE — 97161 PT EVAL LOW COMPLEX 20 MIN: CPT

## 2020-09-28 PROCEDURE — 84466 ASSAY OF TRANSFERRIN: CPT | Performed by: INTERNAL MEDICINE

## 2020-09-28 PROCEDURE — 25010000002 ENOXAPARIN PER 10 MG: Performed by: INTERNAL MEDICINE

## 2020-09-28 PROCEDURE — 85027 COMPLETE CBC AUTOMATED: CPT | Performed by: INTERNAL MEDICINE

## 2020-09-28 PROCEDURE — 97165 OT EVAL LOW COMPLEX 30 MIN: CPT

## 2020-09-28 PROCEDURE — 97110 THERAPEUTIC EXERCISES: CPT

## 2020-09-28 RX ADMIN — HYDROCODONE BITARTRATE AND ACETAMINOPHEN 1 TABLET: 5; 325 TABLET ORAL at 11:28

## 2020-09-28 RX ADMIN — HYDROCODONE BITARTRATE AND ACETAMINOPHEN 1 TABLET: 5; 325 TABLET ORAL at 02:34

## 2020-09-28 RX ADMIN — ASPIRIN 81 MG: 81 TABLET ORAL at 09:11

## 2020-09-28 RX ADMIN — CARVEDILOL 12.5 MG: 6.25 TABLET, FILM COATED ORAL at 17:28

## 2020-09-28 RX ADMIN — SODIUM CHLORIDE, PRESERVATIVE FREE 10 ML: 5 INJECTION INTRAVENOUS at 09:11

## 2020-09-28 RX ADMIN — GABAPENTIN 300 MG: 100 CAPSULE ORAL at 13:59

## 2020-09-28 RX ADMIN — HYDROCODONE BITARTRATE AND ACETAMINOPHEN 1 TABLET: 5; 325 TABLET ORAL at 20:09

## 2020-09-28 RX ADMIN — GABAPENTIN 300 MG: 100 CAPSULE ORAL at 06:32

## 2020-09-28 RX ADMIN — HYDROCODONE BITARTRATE AND ACETAMINOPHEN 1 TABLET: 5; 325 TABLET ORAL at 06:32

## 2020-09-28 RX ADMIN — CLOPIDOGREL BISULFATE 75 MG: 75 TABLET, FILM COATED ORAL at 09:11

## 2020-09-28 RX ADMIN — ENOXAPARIN SODIUM 40 MG: 40 INJECTION SUBCUTANEOUS at 11:25

## 2020-09-28 RX ADMIN — SODIUM CHLORIDE, PRESERVATIVE FREE 10 ML: 5 INJECTION INTRAVENOUS at 22:20

## 2020-09-28 RX ADMIN — CARVEDILOL 12.5 MG: 6.25 TABLET, FILM COATED ORAL at 09:11

## 2020-09-28 RX ADMIN — PRAVASTATIN SODIUM 80 MG: 20 TABLET ORAL at 22:20

## 2020-09-28 RX ADMIN — GABAPENTIN 300 MG: 100 CAPSULE ORAL at 22:20

## 2020-09-29 VITALS
OXYGEN SATURATION: 99 % | DIASTOLIC BLOOD PRESSURE: 65 MMHG | WEIGHT: 104.06 LBS | RESPIRATION RATE: 18 BRPM | SYSTOLIC BLOOD PRESSURE: 105 MMHG | TEMPERATURE: 98.4 F | HEART RATE: 77 BPM | HEIGHT: 64 IN | BODY MASS INDEX: 17.77 KG/M2

## 2020-09-29 LAB
HCT VFR BLD AUTO: 26.9 % (ref 34–46.6)
HGB BLD-MCNC: 9 G/DL (ref 12–15.9)
HOLD SPECIMEN: NORMAL

## 2020-09-29 PROCEDURE — 97535 SELF CARE MNGMENT TRAINING: CPT

## 2020-09-29 PROCEDURE — 97116 GAIT TRAINING THERAPY: CPT

## 2020-09-29 PROCEDURE — 85014 HEMATOCRIT: CPT | Performed by: INTERNAL MEDICINE

## 2020-09-29 PROCEDURE — 25010000002 ENOXAPARIN PER 10 MG: Performed by: INTERNAL MEDICINE

## 2020-09-29 PROCEDURE — 85018 HEMOGLOBIN: CPT | Performed by: INTERNAL MEDICINE

## 2020-09-29 RX ORDER — GABAPENTIN 600 MG/1
300 TABLET ORAL 2 TIMES DAILY
Start: 2020-09-29

## 2020-09-29 RX ORDER — HYDROCODONE BITARTRATE AND ACETAMINOPHEN 5; 325 MG/1; MG/1
1 TABLET ORAL EVERY 4 HOURS PRN
Qty: 18 TABLET | Refills: 0 | Status: SHIPPED | OUTPATIENT
Start: 2020-09-29 | End: 2020-10-02

## 2020-09-29 RX ORDER — ACETAMINOPHEN 325 MG/1
650 TABLET ORAL EVERY 4 HOURS PRN
Start: 2020-09-29

## 2020-09-29 RX ADMIN — ENOXAPARIN SODIUM 30 MG: 30 INJECTION SUBCUTANEOUS at 10:00

## 2020-09-29 RX ADMIN — CLOPIDOGREL BISULFATE 75 MG: 75 TABLET, FILM COATED ORAL at 09:59

## 2020-09-29 RX ADMIN — ASPIRIN 81 MG: 81 TABLET ORAL at 09:59

## 2020-09-29 RX ADMIN — GABAPENTIN 300 MG: 100 CAPSULE ORAL at 14:48

## 2020-09-29 RX ADMIN — CARVEDILOL 12.5 MG: 6.25 TABLET, FILM COATED ORAL at 10:00

## 2020-09-29 RX ADMIN — SODIUM CHLORIDE, PRESERVATIVE FREE 10 ML: 5 INJECTION INTRAVENOUS at 10:00

## 2020-09-29 RX ADMIN — GABAPENTIN 300 MG: 100 CAPSULE ORAL at 06:12

## 2020-09-29 RX ADMIN — HYDROCODONE BITARTRATE AND ACETAMINOPHEN 1 TABLET: 5; 325 TABLET ORAL at 06:15

## 2020-09-30 ENCOUNTER — READMISSION MANAGEMENT (OUTPATIENT)
Dept: CALL CENTER | Facility: HOSPITAL | Age: 80
End: 2020-09-30

## 2020-09-30 NOTE — OUTREACH NOTE
Prep Survey      Responses   [facility] facility patient discharged from?  [place]   Is LACE score < 7 ?  No   Eligibility  Readm Mgmt   Discharge diagnosis  Closed intertrochanteric fracture of left femur    Does the patient have one of the following disease processes/diagnoses(primary or secondary)?  Total Joint Replacement   Does the patient have Home health ordered?  Yes   What is the Home health agency?   [facility]    Is there a DME ordered?  Yes   What DME was ordered?  [employer]? per [facility]    Prep survey completed?  Yes          Nat Angel RN

## 2020-10-02 ENCOUNTER — READMISSION MANAGEMENT (OUTPATIENT)
Dept: CALL CENTER | Facility: HOSPITAL | Age: 80
End: 2020-10-02

## 2020-10-02 NOTE — OUTREACH NOTE
Total Joint Week 1 Survey      Responses   [facility] patient discharged from?  [place]   Does the patient have one of the following disease processes/diagnoses(primary or secondary)?  Total Joint Replacement   Joint surgery performed?  Hip   Week 1 attempt successful?  Yes   Call start time  1236   Call end time  1243   Discharge diagnosis  Closed intertrochanteric fracture of left femur    Person spoke with today (if not patient) and relationship  daughter   Does the patient have all medications related to this admission filled (includes all antibiotics, pain medications, etc.)  Yes   Is the patient taking all medications as directed (includes completed medication regime)?  Yes   Does the patient have a follow up appointment with their surgeon?  Yes   Has the patient kept scheduled appointments due by today?  Yes   What is the Home health agency?   [facility]    What DME was ordered?  [employer]? per [facility]    Has all DME been delivered?  Yes   Psychosocial issues?  No   When is the first therapy visit scheduled (PO Day) including how many days per week   daughter is unsure   Has the patient began therapy sessions (either in the home or as an out patient)?  Yes   If the patient has started attending therapy, what post op day did they begin to attend (either in home or as an out patient)?    PT visited today for first time and plans to come back Monday   Does the patient have a wound vac in place?  No   Has the patient fallen since discharge?  No   Did the patient receive a copy of their discharge instructions?  Yes   Nursing interventions  Reviewed instructions with patient   What is the patient's perception of their functional status since discharge?  Improving   Did the patient implement home safety suggestions from pre-surgery classes if attended?  Yes   Is the patient/caregiver able to teach back the hierarchy of who to call/visit for symptoms/problems? PCP, Specialist, Home health nurse, Urgent Care, ED,  "911  Yes   Week 1 call completed?  Yes   Wrap up additional comments  Daughter reports patient is doing well. She is \"a bit emotional\" but has been in some pain and lost a pet 2 weeks ago.  She states patient has someone with her at all times.  Denies any questions at this time.           Emilia Mckenna RN  "

## 2020-10-12 ENCOUNTER — READMISSION MANAGEMENT (OUTPATIENT)
Dept: CALL CENTER | Facility: HOSPITAL | Age: 80
End: 2020-10-12

## 2020-10-12 NOTE — OUTREACH NOTE
Total Joint Week 2 Survey      Responses   [facility] patient discharged from?  [place]   Does the patient have one of the following disease processes/diagnoses(primary or secondary)?  Total Joint Replacement   Joint surgery performed?  Hip   Week 2 attempt successful?  Yes   Call start time  1158   Call end time  1209   Has the patient been back in either the hospital or Emergency Department since discharge?  No   Medication alerts for this patient  Pt is not taking pain medication. She is taking Aleve.   Has the patient kept scheduled appointments due by today?  Yes   What is the Home health agency?   [facility]    What DME was ordered?  Pt continues to use a walker.   Has the patient fallen since discharge?  No   What is the patient's perception of their functional status since discharge?  Improving   Week 2 call completed?  Yes   Wrap up additional comments  Pt is better this week. She is not taking pain medication. She is using her walker and doing well. Her daughter will be moving in with her in a couple of weeks.          Iliana Campbell RN

## 2020-10-27 ENCOUNTER — READMISSION MANAGEMENT (OUTPATIENT)
Dept: CALL CENTER | Facility: HOSPITAL | Age: 80
End: 2020-10-27

## 2020-10-27 NOTE — OUTREACH NOTE
Total Joint Month 1 Survey      Responses   [facility] facility patient discharged from?  [place]   Does the patient have one of the following disease processes/diagnoses(primary or secondary)?  Total Joint Replacement   Joint surgery performed?  Hip   Month 1 attempt successful?  No   Unsuccessful attempts  Attempt 1          Ameena Jimenez LPN

## 2020-10-30 ENCOUNTER — READMISSION MANAGEMENT (OUTPATIENT)
Dept: CALL CENTER | Facility: HOSPITAL | Age: 80
End: 2020-10-30

## 2020-10-30 NOTE — OUTREACH NOTE
Total Joint Month 1 Survey      Responses   [facility] patient discharged from?  [place]   Does the patient have one of the following disease processes/diagnoses(primary or secondary)?  Total Joint Replacement   Joint surgery performed?  Hip   Month 1 attempt successful?  Yes   Call start time  1618   Call end time  1623   Has the patient been back in either the hospital or Emergency Department since discharge?  No   Discharge diagnosis  Closed intertrochanteric fracture of left femur    Is the patient taking all medications as directed (includes completed medication regime)?  Yes   Is the patient able to teach back alternate methods of pain control?  Ice, Reposition, Correct alignment, Short, frequent activity   Has the patient kept scheduled appointments due by today?  Yes   Is the patient still receiving Home Health Services?  N/A   Is the patient still attending therapy sessions(either in the home or as an outpatient)?  No [Patient has graduated from therapy and doing well.]   Has the patient fallen since discharge?  No   What is the patient's perception of their functional status since discharge?  Improving   If the patient is a current smoker, are they able to teach back resources for cessation?  Not a smoker   Is the patient/caregiver able to teach back the hierarchy of who to call/visit for symptoms/problems? PCP, Specialist, Home health nurse, Urgent Care, ED, 911  Yes   Month 1 call completed?  Yes          Estiven Salinas RN

## 2020-12-02 ENCOUNTER — READMISSION MANAGEMENT (OUTPATIENT)
Dept: CALL CENTER | Facility: HOSPITAL | Age: 80
End: 2020-12-02

## 2020-12-02 NOTE — OUTREACH NOTE
Total Joint Month 2 Survey      Responses   [facility] patient discharged from?  [place]   Does the patient have one of the following disease processes/diagnoses(primary or secondary)?  Total Joint Replacement   Joint surgery performed?  Hip   Month 2 attempt successful?  Yes   Call start time  1540   Call end time  1556   Has the patient been back in either the hospital or Emergency Department since discharge?  No   Discharge diagnosis  Closed intertrochanteric fracture of left femur    Is the patient taking all medications as directed (includes completed medication regime)?  Yes   Has the patient kept scheduled appointments due by today?  Yes   Is the patient still receiving Home Health Services?  N/A   Has the patient fallen since discharge?  No   What is the patient's perception of their functional status since discharge?  Improving   Is the patient able to teach back signs and symptoms of infection?  Increased swelling or redness around incision (not associated with surgical edema)   Is the patient/caregiver able to teach back the hierarchy of who to call/visit for symptoms/problems? PCP, Specialist, Home health nurse, Urgent Care, ED, 911  Yes   Additional teach back comments  Pt doing well and trying to stafe safe from COVID. 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   Month 2 Call Completed?  Yes          Julee Carbajal RN

## 2021-01-05 ENCOUNTER — READMISSION MANAGEMENT (OUTPATIENT)
Dept: CALL CENTER | Facility: HOSPITAL | Age: 81
End: 2021-01-05

## 2021-01-05 NOTE — OUTREACH NOTE
Total Joint Month 3 Survey      Responses   [facility] patient discharged from?  [place]   Does the patient have one of the following disease processes/diagnoses(primary or secondary)?  Total Joint Replacement   Month 3 attempt successful?  Yes   Call start time  1349   Call end time  1353   Has the patient been back in either the hospital or Emergency Department since discharge?  No   Is the patient taking all medications as directed (includes completed medication regime)?  Yes   Has the patient kept scheduled appointments due by today?  Yes   Is the patient still receiving Home Health Services?  No   Is the patient still attending therapy sessions(either in the home or as an outpatient)?  No   Has the patient fallen since discharge?  No   What is the patient's perception of their functional status since discharge?  Improving   Graduated  Yes   Is the patient interested in additional calls from an ambulatory ?  NOTE:  applies to high risk patients requiring additional follow-up.  No   Did the patient feel the follow up calls were helpful during their recovery period?  Yes   Was the number of calls appropriate?  Yes   Wrap up additional comments  She states is doing well, any issues she is having is from old injury of ankle and knee, hip is doing well, will call us if needed          Jaky Colunga RN

## 2021-09-01 RX ORDER — TRAMADOL HYDROCHLORIDE 50 MG/1
50 TABLET ORAL 2 TIMES DAILY
COMMUNITY

## 2021-09-08 ENCOUNTER — OFFICE VISIT (OUTPATIENT)
Dept: VASCULAR SURGERY | Age: 81
End: 2021-09-08
Payer: MEDICARE

## 2021-09-08 VITALS
WEIGHT: 97 LBS | HEIGHT: 64 IN | HEART RATE: 79 BPM | DIASTOLIC BLOOD PRESSURE: 75 MMHG | SYSTOLIC BLOOD PRESSURE: 147 MMHG | BODY MASS INDEX: 16.56 KG/M2 | RESPIRATION RATE: 16 BRPM | TEMPERATURE: 97.1 F | OXYGEN SATURATION: 93 %

## 2021-09-08 DIAGNOSIS — I70.1 RENAL ARTERY STENOSIS (HCC): Primary | ICD-10-CM

## 2021-09-08 DIAGNOSIS — I65.23 BILATERAL CAROTID ARTERY STENOSIS: ICD-10-CM

## 2021-09-08 PROCEDURE — 4040F PNEUMOC VAC/ADMIN/RCVD: CPT | Performed by: NURSE PRACTITIONER

## 2021-09-08 PROCEDURE — G8400 PT W/DXA NO RESULTS DOC: HCPCS | Performed by: NURSE PRACTITIONER

## 2021-09-08 PROCEDURE — G8427 DOCREV CUR MEDS BY ELIG CLIN: HCPCS | Performed by: NURSE PRACTITIONER

## 2021-09-08 PROCEDURE — 4004F PT TOBACCO SCREEN RCVD TLK: CPT | Performed by: NURSE PRACTITIONER

## 2021-09-08 PROCEDURE — 1123F ACP DISCUSS/DSCN MKR DOCD: CPT | Performed by: NURSE PRACTITIONER

## 2021-09-08 PROCEDURE — G8419 CALC BMI OUT NRM PARAM NOF/U: HCPCS | Performed by: NURSE PRACTITIONER

## 2021-09-08 PROCEDURE — 99204 OFFICE O/P NEW MOD 45 MIN: CPT | Performed by: NURSE PRACTITIONER

## 2021-09-08 PROCEDURE — 1090F PRES/ABSN URINE INCON ASSESS: CPT | Performed by: NURSE PRACTITIONER

## 2021-09-08 NOTE — PROGRESS NOTES
Val Mcleod (:  1940) is a 68 y.o. female,New patient, (no visit in greater than 3 years) here for evaluation of the following chief complaint(s): Other (referral from osmani ron, carotid artery stenosis)            SUBJECTIVE/OBJECTIVE:  She presents for follow up of carotid artery stenosis. She has a known history of carotid artery stenosis for < 1 year. Her current treatment includes ASA EC daily. She denies a history of CVA. She reports has not had TIA's, episodes of lateralizing weakness and episodes of amaurosis fugax. She has a history of possible renal artery stenosis for a duration of 1 - 5 years. She presents with stable hypertension. She is currently on 2 antihypertensive medications. Her current treatment includes ASA 81 mg po qd. She does not have a history of a previous intervention. Risk factors for atherosclerosis include hyperlipidemia and hypertension. At present she reports symptoms of none. Differential diagnosis for hypertension includes but is not limited to essential HTN aldosteronoma, pheochromocytoma, renal artery stenosis, aortic coarctation. Lab Results   Component Value Date    BUN 17 2014     Lab Results   Component Value Date    CREATININE 0.8 2014         Val Mcleod is a 68 y.o. female with the following history as recorded in [facility]:  Patient Active Problem List    Diagnosis Date Noted    Renal artery stenosis ([facility] [address] 2018    S/P laparoscopic cholecystectomy 2014    S/P hernia repair 2014     Current Outpatient Medications   Medication Sig Dispense Refill    Omega 3 1200 MG CAPS Take by mouth      pravastatin (PRAVACHOL) 80 MG tablet Take 80 mg by mouth daily      ALPRAZolam (XANAX) 0.25 MG tablet       carvedilol (COREG) 12.5 MG tablet       clopidogrel (PLAVIX) 75 MG tablet       triamterene-hydrochlorothiazide (DYAZIDE) 37.5-25 MG per capsule       aspirin 81 MG chewable tablet Take 81 mg by mouth daily.       levalbuterol (XOPENEX HFA) 45 MCG/ACT inhaler Inhale 1-2 puffs into the lungs every 4 hours as needed.  nitroGLYCERIN (NITROSTAT) 0.4 MG SL tablet Place 0.4 mg under the tongue every 5 minutes as needed.  traMADol (ULTRAM) 50 MG tablet Take 50 mg by mouth 2 times daily.  Fluticasone furoate-vilanterol (BREO ELLIPTA) 200-25 MCG/INH AEPB inhaler Inhale into the lungs daily      OXYGEN Inhale into the lungs      alendronate (FOSAMAX) 70 MG tablet       gabapentin (NEURONTIN) 600 MG tablet       simvastatin (ZOCOR) 40 MG tablet       cyanocobalamin 1000 MCG/ML injection Inject 1,000 mcg into the muscle every 30 days.  Misc Natural Products (OSTEO BI-FLEX ADV JOINT SHIELD PO) Take  by mouth.  vitamin D (CHOLECALCIFEROL) 1000 UNIT TABS tablet Take 1,000 Units by mouth daily. No current facility-administered medications for this visit.      Allergies: Codeine, Dye [iodides], Empirin [aspirin], Ibuprofen, Levofloxacin, Lipitor, Other, Pcn [penicillins], Sulfa antibiotics, and Talwin [pentazocine]  Past Medical History:   Diagnosis Date    Basal cell carcinoma     face    CAD (coronary artery disease)     Carotid bruit     left    Chronic anemia     Chronic bronchitis (HCC)     COPD [facility]ch[facility] obstructive pulmonary disease) ([facility] [address]     Hip fx ([facility] [address]     left    History of heart artery stent     [facility]    Hyperlipidemia     Hyperlipidemia     Hypertension     Neuropathy     Osteoporosis     PONV (postoperative nausea and vomiting)     after each surgery    Renal artery stenosis (HCC) carotid stenosis     Past Surgical History:   Procedure Laterality Date    ANKLE FRACTURE SURGERY Left 2005    ANKLE SURGERY Left     CABG WITH AORTIC VALVE REPAIR  1998    CARDIAC CATHETERIZATION  08/2012    Dr. Marv Velez  1/27/14    CORONARY ANGIOPLASTY WITH STENT PLACEMENT  2010    Dr Devin Clancy  HERNIA REPAIR  14    suture repair of subxiphoid trocar hernia     HIP SURGERY      left hip    SHOULDER SURGERY Right     SKIN CANCER EXCISION      on nose      History reviewed. No pertinent family history. Social History     Tobacco Use    Smoking status: Current Some Day Smoker     Packs/day: 1.00     Years: 30.00     Pack years: 30.00     Last attempt to quit: 2013     Years since quittin.0    Smokeless tobacco: Never Used   Substance Use Topics    Alcohol use: No         Eyes  no sudden vision change or amaurosis. Respiratory  has shortness of breath, uses oxygen at night  Cardiovascular  no chest pain or syncope. No  significant leg swelling. No claudication. Musculoskeletal  no gait disturbance  Skin  no new wound. Neurologic   No speech difficulty or lateralizing weakness. All other review of systems are negative. Physical Exam    BP (!) 147/75 (Site: Left Upper Arm)   Pulse 79   Temp 97.1 °F (36.2 °C)   Resp 16   Ht 5' 4\" (1.626 m)   Wt 97 lb (44 kg)   SpO2 93%   BMI 16.65 kg/m²       Neck- carotid pulses 2+ to palpation with no bruit  Cardiovascular  Regular rate and rhythm. Pulmonary  effort appears normal.  No respiratory distress. Lungs - Breath sounds normal. No wheezes or rales. Extremities - without edema   Neurologic  alert and oriented X 3. Physiologic. Face symmetric. Skin  warm, dry, and intact. no wound  Psychiatric  mood, affect, and behavior appear normal.  Judgment and thought processes appear normal.    Risk factors for atherosclerosis of all vascular beds have been reviewed with the patient including:  Family history, tobacco abuse in all forms, elevated cholesterol, hyperlipidemia, and diabetes. Doppler results:    Right CCA/ICA 50-69% stenotic  Left CCA/ICA 50-69% stenotic  Right verterbral artery flow is antegrade  Left verterbral artery flow is antegrade  Individual velocities reviewed: Yes.   Results were

## 2022-02-04 ENCOUNTER — TELEPHONE (OUTPATIENT)
Dept: NEUROSURGERY | Age: 82
End: 2022-02-04

## 2022-02-07 ENCOUNTER — OFFICE VISIT (OUTPATIENT)
Dept: NEUROSURGERY | Age: 82
End: 2022-02-07
Payer: MEDICARE

## 2022-02-07 VITALS
BODY MASS INDEX: 14.34 KG/M2 | SYSTOLIC BLOOD PRESSURE: 108 MMHG | WEIGHT: 84 LBS | DIASTOLIC BLOOD PRESSURE: 74 MMHG | HEIGHT: 64 IN | HEART RATE: 67 BPM | OXYGEN SATURATION: 100 %

## 2022-02-07 DIAGNOSIS — R41.3 MEMORY LOSS: ICD-10-CM

## 2022-02-07 DIAGNOSIS — R41.3 MEMORY LOSS: Primary | ICD-10-CM

## 2022-02-07 LAB
C-REACTIVE PROTEIN: <0.3 MG/DL (ref 0–0.5)
RHEUMATOID FACTOR: <10 IU/ML

## 2022-02-07 PROCEDURE — G8419 CALC BMI OUT NRM PARAM NOF/U: HCPCS | Performed by: PSYCHIATRY & NEUROLOGY

## 2022-02-07 PROCEDURE — G8427 DOCREV CUR MEDS BY ELIG CLIN: HCPCS | Performed by: PSYCHIATRY & NEUROLOGY

## 2022-02-07 PROCEDURE — G8400 PT W/DXA NO RESULTS DOC: HCPCS | Performed by: PSYCHIATRY & NEUROLOGY

## 2022-02-07 PROCEDURE — 99204 OFFICE O/P NEW MOD 45 MIN: CPT | Performed by: PSYCHIATRY & NEUROLOGY

## 2022-02-07 PROCEDURE — 1090F PRES/ABSN URINE INCON ASSESS: CPT | Performed by: PSYCHIATRY & NEUROLOGY

## 2022-02-07 PROCEDURE — 4004F PT TOBACCO SCREEN RCVD TLK: CPT | Performed by: PSYCHIATRY & NEUROLOGY

## 2022-02-07 PROCEDURE — G8484 FLU IMMUNIZE NO ADMIN: HCPCS | Performed by: PSYCHIATRY & NEUROLOGY

## 2022-02-07 PROCEDURE — 4040F PNEUMOC VAC/ADMIN/RCVD: CPT | Performed by: PSYCHIATRY & NEUROLOGY

## 2022-02-07 PROCEDURE — 1123F ACP DISCUSS/DSCN MKR DOCD: CPT | Performed by: PSYCHIATRY & NEUROLOGY

## 2022-02-07 NOTE — PROGRESS NOTES
[facility] Neurology Office Note      Patient:   De Gant  MR#:    [id number]  Account Number:                         YOB: 1940  Date of Evaluation:  2/7/2022  Time of Note:                          1:36 PM  Primary/Referring Physician:  Angélica Blas MD   Consulting Physician:  Macarena Juarez DO    NEW PATIENT CONSULTATION    Chief Complaint   Patient presents with    Dizziness     New pateint referral    Hallucinations     Seeing things that are not there    Other     [address]    De Gant is a 80y.o. year old female here for dizziness, hallucinations. Symptoms started a few years ago after prior possible TBI, concussion. Noting some visual difficulty in the left eye as well chronically. She notes visual hallucinations. Some cognitive loss noted as well. No tremor. Notes some dizziness intermittently. Some balance difficulty noted as well. CBC, CMP, TSH, B12 normal.  No history of depression, some anxiety noted, on xanax but only at 0.25 mg dosing. History of CAD, CABG and stent placement noted. Recent CT negative.      Past Medical History:   Diagnosis Date    Basal cell carcinoma     face    CAD (coronary artery disease)     Carotid bruit     left    Chronic anemia     Chronic bronchitis (HCC)     COPD (chronic obstructive pulmonary disease) (HCC)     Hip fx (HCC)     left    History of heart artery stent     [facility]    Hyperlipidemia     Hyperlipidemia     Hypertension     Neuropathy     Osteoporosis     PONV (postoperative nausea and vomiting)     after each surgery    Renal artery stenosis (HCC) carotid stenosis       Past Surgical History:   Procedure Laterality Date    ANKLE FRACTURE SURGERY Left 2005    ANKLE SURGERY Left     CABG WITH AORTIC VALVE REPAIR  1998    CARDIAC CATHETERIZATION  08/2012    Dr. Zee Jack  1/27/14    CORONARY ANGIOPLASTY WITH STENT PLACEMENT      Dr Stephen Denton  14    suture repair of subxiphoid trocar hernia     HIP SURGERY      left hip    SHOULDER SURGERY Right     SKIN CANCER EXCISION      on nose        History reviewed. No pertinent family history. Social History     Socioeconomic History    Marital status:      Spouse name: Not on file    Number of children: Not on file    Years of education: Not on file    Highest education level: Not on file   Occupational History    Not on file   Tobacco Use    Smoking status: Current Some Day Smoker     Packs/day: 1.00     Years: 30.00     Pack years: 30.00     Last attempt to quit: 2013     Years since quittin.4    Smokeless tobacco: Never Used   Vaping Use    Vaping Use: Never used   Substance and Sexual Activity    Alcohol use: No    Drug use: No    Sexual activity: Not Currently   Other Topics Concern    Not on file   Social History Narrative    Not on file     Social Determinants of Health     Financial Resource Strain:     Difficulty of Paying Living Expenses: Not on file   Food Insecurity:     Worried About Running Out of Food in the Last Year: Not on file    Lauryn of Food in the Last Year: Not on file   Transportation Needs:     Lack of Transportation (Medical): Not on file    Lack of Transportation (Non-Medical):  Not on file   Physical Activity:     Days of Exercise per Week: Not on file    Minutes of Exercise per Session: Not on file   Stress:     Feeling of Stress : Not on file   Social Connections:     Frequency of Communication with Friends and Family: Not on file    Frequency of Social Gatherings with Friends and Family: Not on file    Attends Mandaeism Services: Not on file    Active Member of Clubs or Organizations: Not on file    Attends Club or Organization Meetings: Not on file    Marital Status: Not on file   Intimate Partner Violence:     Fear of Current or Ex-Partner: Not on file    Emotionally Abused: Not on file    Physically Abused: Not on file    Sexually Abused: Not on file   Housing Stability:     Unable to Pay for Housing in the Last Year: Not on file    Number of Places Lived in the Last Year: Not on file    Unstable Housing in the Last Year: Not on file       Current Outpatient Medications   Medication Sig Dispense Refill    traMADol (ULTRAM) 50 MG tablet Take 50 mg by mouth 2 times daily.  Fluticasone furoate-vilanterol (BREO ELLIPTA) 200-25 MCG/INH AEPB inhaler Inhale into the lungs daily      OXYGEN Inhale into the lungs      Omega 3 1200 MG CAPS Take by mouth      pravastatin (PRAVACHOL) 80 MG tablet Take 80 mg by mouth daily      ALPRAZolam (XANAX) 0.25 MG tablet Take 0.25 mg by mouth 3 times daily.  carvedilol (COREG) 12.5 MG tablet Take 12.5 mg by mouth 2 times daily       clopidogrel (PLAVIX) 75 MG tablet Take 75 mg by mouth daily       triamterene-hydrochlorothiazide (DYAZIDE) 37.5-25 MG per capsule Take 1 capsule by mouth three times a week       Misc Natural Products (OSTEO BI-FLEX ADV JOINT SHIELD PO) Take  by mouth.  vitamin D (CHOLECALCIFEROL) 1000 UNIT TABS tablet Take 1,000 Units by mouth daily.  aspirin 81 MG chewable tablet Take 81 mg by mouth daily.  levalbuterol (XOPENEX HFA) 45 MCG/ACT inhaler Inhale 1-2 puffs into the lungs every 4 hours as needed.  nitroGLYCERIN (NITROSTAT) 0.4 MG SL tablet Place 0.4 mg under the tongue every 5 minutes as needed. No current facility-administered medications for this visit.        Allergies   Allergen Reactions    Codeine     Dye [Iodides]     Empirin [Aspirin]      She can take ASA but not EMPIRIN    Ibuprofen      Was told by pcp to never take    Levofloxacin     Lipitor     Other      TB skin test    Pcn [Penicillins]     Sulfa Antibiotics     Talwin [Pentazocine]          REVIEW OF SYSTEMS    Constitutional: []Fever []Sweat []Chills [] Recent Injury [x] Denies all unless marked  HEENT:[x]Headache  [x] Head Injury/Hearing Loss  [] Sore Throat  [] Ear Ache/Dizziness  [x] Denies all unless marked  Spine:  [x] Neck pain  [x] Back pain  [] Sciaticia  [x] Denies all unless marked  Cardiovascular:[]Heart Disease []Chest Pain [] Palpitations  [x] Denies all unless marked  Pulmonary: []Shortness of Breath []Cough   [x] Denies all unless marke  Gastrointestinal: [x]Nausea  [x]Vomiting  []Abdominal Pain  []Constipation  []Diarrhea  []Dark Bloody Stools  [x] Denies all unless marked  Psychiatric/Behavioral:[x] Depression [x] Anxiety [x] Denies all unless marked  Genitourinary:   [] Frequency  [] Urgency  [] Incontinence [] Pain with Urination  [x] Denies all unless marked  Extremities: []Pain  []Swelling  [x] Denies all unless marked  Musculoskeletal: [] Muscle Pain  [] Joint Pain  [] Arthritis [] Muscle Cramps [] Muscle Twitches  [x] Denies all unless marked  Sleep: [x] Insomnia [] Snoring [] Restless Legs [] Sleep Apnea  [] Daytime Sleepiness  [x] Denies all unless marked  Skin:[] Rash [] Skin Discoloration [x] Denies all unless marked   Neurological: [x]Visual Disturbance/Memory Loss [x] Loss of Balance [] Slurred Speech/Weakness [] Seizures  [x] Vertigo/Dizziness [x] Denies all unless marked     I have reviewed the above ROS with the patient and agree with the ROS as documented above.          PHYSICAL EXAM    Constitutional    /74   Pulse 67   Ht 5' 4\" (1.626 m)   Wt 84 lb (38.1 kg)   SpO2 100%   BMI 14.42 kg/m²   General appearance: No acute distress   EYES -   Conjunctiva normal  Pupillary exam as below, see CN exam in the neurologic exam  ENT-    No scars, masses, or lesions over external nose or ears  Hearing normal bilaterally to finger rub  Cardiovascular -   No clubbing, cyanosis, or edema   Pulmonary-   Good expansion, normal effort without use of accessory muscles  Musculoskeletal    No significant wasting of muscles noted  Gait as below, see gait exam in the neurologic exam  Muscle strength, tone, stability as below. No bony deformities  Skin    Warm, dry, and intact to inspection and palpation. No rash, erythema, or pallor  Psychiatric    Mood, affect, and behavior appear normal    Memory as below see mental status examination in the neurologic exam    NEUROLOGICAL EXAM    Mental status   [x]Awake, alert, oriented   [x]Affect attention and concentration appear appropriate  []Recent and remote memory appears unremarkable  [x]Speech normal without dysarthria or aphasia, comprehension and repetition intact. COMMENTS:   18/30 MoCA   Cranial Nerves [x]No VF deficit to confrontation  [x]PERRLA, EOMI, no nystagmus, conjugate eye movements, no ptosis  [x]Face symmetric  [x]Facial sensation intact  [x]Tongue midline no atrophy or fasciculations present  [x]Palate midline, hearing to finger rub normal bilaterally  [x]Shoulder shrug and SCM testing normal bilaterally  COMMENTS:   Motor   [x]5/5 strength x 4 extremities  [x]Normal bulk and tone  [x]No tremor present  [x]No rigidity or bradykinesia noted  COMMENTS:   Sensory  [x]Sensation intact to light touch, pin prick, vibration, and proprioception BLE  []Sensation intact to light touch, pin prick, vibration, and proprioception BUE  COMMENTS:   Coordination [x]FTN normal bilaterally   []HTS normal bilaterally  []DELFIN normal bilaterally.    COMMENTS:   Reflexes  [x]Symmetric and non-pathological  [x]Toes down going bilaterally  [x]No clonus present  COMMENTS:   Gait                  []Normal steady gait    []Ataxic    []Spastic     []Magnetic     []Shuffling  COMMENTS: Cautious, not overtly parkinsonian       LABS RECORD AND IMAGING REVIEW (As below and per HPI)      Lab Results   Component Value Date    WBC 8.52 01/20/2014    HGB 14.3 01/20/2014    HCT 42.6 01/20/2014    HCT 42.6 01/20/2014    MCV 98.2 01/20/2014     01/20/2014     Lab Results   Component Value Date     01/20/2014 K 4.4 01/20/2014     01/20/2014    CO2 27 01/20/2014    BUN 17 01/20/2014    CREATININE 0.8 01/20/2014    GLUCOSE 86 01/20/2014    CALCIUM 9.7 01/20/2014    PROT 7.0 01/20/2014    LABALBU 4.7 01/20/2014    ALKPHOS 65 01/20/2014    AST 25 01/20/2014    ALT 13 01/20/2014    LABGLOM 75 01/20/2014     CBC, CMP, TSH, B12 normal. Ct head nothing acute. CD without severe stenosis. ASSESSMENT:    Deonte Zapata is a 80y.o. year old female here for dizziness, memory loss, hallucinations. Suspect underlying dementia. More atypical source such as frontotemporal dementia is not excluded. Lewy body felt less likely given lack of parkinsonism. Dizziness is likely multifactorial, plan further workup. PLAN:  1. MRI Brain  2. EEG  3. Additional labs including inflammatory labs, thyroid abs, autoimmune encephalitis and paraneoplastic panels. 4.  Consider memory medications pending above  5. Limit Xanax   6. Consider LP, PET, formal neuropsych testing pending above. 7.  Follow up with cardiology as directed  8. Discussed more supervision, medication monitoring, limit driving.      Liliana Charles DO  Board Certified Neurology

## 2022-02-08 LAB
Lab: NORMAL
Lab: NORMAL
REPORT: NORMAL
REPORT: NORMAL
THIS TEST SENT TO: NORMAL
THIS TEST SENT TO: NORMAL

## 2022-02-10 LAB
RPR: NORMAL
THYROID PEROXIDASE (TPO) ABS: 0.3 IU/ML (ref 0–9)

## 2022-02-11 LAB
ANA IGG, ELISA: NORMAL
LYME (B. BURGDORFERI) AB IGG WB: NEGATIVE
LYME AB IGM BY WB:: NEGATIVE
THYROID STIMULATING IMMUNOGLOBULIN: <0.1 IU/L

## 2022-02-14 LAB
ARSENIC BLOOD: <10 UG/L
LEAD LEVEL BLOOD: <2 UG/DL
MERCURY BLOOD: <2.5 UG/L

## 2022-02-17 LAB
MISCELLANEOUS LAB TEST ORDER: NORMAL
MISCELLANEOUS LAB TEST ORDER: NORMAL

## 2022-03-08 ENCOUNTER — HOSPITAL ENCOUNTER (OUTPATIENT)
Dept: NEUROLOGY | Age: 82
Discharge: HOME | End: 2022-03-08
Payer: MEDICARE

## 2022-03-08 ENCOUNTER — HOSPITAL ENCOUNTER (OUTPATIENT)
Dept: MRI IMAGING | Age: 82
Discharge: HOME | End: 2022-03-08
Payer: MEDICARE

## 2022-03-08 DIAGNOSIS — R41.3 MEMORY LOSS: ICD-10-CM

## 2022-03-08 PROCEDURE — 95816 EEG AWAKE AND DROWSY: CPT | Performed by: PSYCHIATRY & NEUROLOGY

## 2022-03-08 PROCEDURE — 95816 EEG AWAKE AND DROWSY: CPT

## 2022-03-08 PROCEDURE — 70551 MRI BRAIN STEM W/O DYE: CPT

## 2022-04-04 DIAGNOSIS — R16.0 LIVER MASS: Primary | ICD-10-CM

## 2022-04-04 NOTE — PROGRESS NOTES
MEDICAL ONCOLOGY CONSULTATION    Pt Name: Lisseth Escamilla  MRN: [id number]  [place]: 1940  Date of evaluation: 4/7/2022    REASON FOR CONSULTATION:  Liver lesion  REQUESTING PHYSICIAN: Dr Alen Chavarria    History Obtained From:  patient and old medical records    HISTORY OF PRESENT ILLNESS:    Diagnosis  · Liver lesion, March 2022    Treatment Summary  · To be determined    Cancer History  Lisseth Escamilla was first seen by me on 4/7/2022. The patient had complaints of right abdominal quadrant pain. In addition, 20 pound weight loss. She underwent imaging studies at outside facility, [facility].  · 3/8/22 MRI brain w/o (L): No acute intracranial abnormalities. Mild vascular loss and mild chronic microvascular changes. · 3/30/22 CT abd/pelvis w/o [facility]): new low-density masses throughout the liver that are poorly visualized without contrast measuring up to 3 cm in the left hepatic lobe. 1.6 cm left adrenal nodule measuring 1 HEU, consistent with adenoma. Wall thickening of the right colon and hepatic flexure. Lymph node in the right abdominal mesentery measuring 1 cm. · 3/30/22 CT chest w/o [facility]): Bilateral lower lobe lung nodules measuring up to 3mm, very low suspicion for malignancy. 1.3 cm subcarinal node. Calcific atherosclerosis, status post coronary bypass. Colonic diverticulosis. Emphysema. · 4/7/2022she was first seen by me. Recommend a CT-guided biopsy of liver lesions.   · 4/7/2022CA 19-9 886, CEA 11.8    Past Medical History:    Past Medical History:   Diagnosis Date    Basal cell carcinoma     face    CAD (coronary artery disease)     Carotid bruit     left    Chronic anemia     Chronic bronchitis (HCC)     COPD (chronic obstructive pulmonary disease) (HCC)     Hip fx (HCC)     left    History of heart artery stent     History of hip surgery 09/27/2020    [facility] hip    Hyperlipidemia     Hyperlipidemia     Hypertension     Neuropathy     Osteoporosis     PONV (postoperative nausea and vomiting)     after each surgery    Renal artery stenosis (HCC) carotid stenosis       Past Surgical History:    Past Surgical History:   Procedure Laterality Date    ANKLE FRACTURE SURGERY Left 2005    ANKLE SURGERY Left     CABG WITH AORTIC VALVE REPAIR  1998    CARDIAC CATHETERIZATION  08/2012    Dr. Alyx Tamayo  1/27/14    CORONARY ANGIOPLASTY WITH STENT PLACEMENT  2010    Dr Cristiane Cisneros  1/27/14    suture repair of subxiphoid trocar hernia     HIP SURGERY  2020    left hip    SHOULDER SURGERY Right     SKIN CANCER EXCISION  2020    on nose        Social History:    Marital status:   Smoking status: Former; Quit in 0  ETOH status:No  Resides: [place], [place]    Family History:   No family history on file. Current Hospital Medications:    Current Outpatient Medications   Medication Sig Dispense Refill    traMADol (ULTRAM) 50 MG tablet Take 50 mg by mouth 2 times daily.  Fluticasone furoate-vilanterol (BREO ELLIPTA) 200-25 MCG/INH AEPB inhaler Inhale into the lungs daily      OXYGEN Inhale into the lungs      Omega 3 1200 MG CAPS Take by mouth      pravastatin (PRAVACHOL) 80 MG tablet Take 80 mg by mouth daily      ALPRAZolam (XANAX) 0.25 MG tablet Take 0.25 mg by mouth 3 times daily.  carvedilol (COREG) 12.5 MG tablet Take 12.5 mg by mouth 2 times daily       clopidogrel (PLAVIX) 75 MG tablet Take 75 mg by mouth daily       triamterene-hydrochlorothiazide (DYAZIDE) 37.5-25 MG per capsule Take 1 capsule by mouth three times a week       Misc Natural Products (OSTEO BI-FLEX ADV JOINT SHIELD PO) Take  by mouth.  vitamin D (CHOLECALCIFEROL) 1000 UNIT TABS tablet Take 1,000 Units by mouth daily.  aspirin 81 MG chewable tablet Take 81 mg by mouth daily.       levalbuterol (XOPENEX HFA) 45 MCG/ACT inhaler Inhale 1-2 puffs into the lungs every 4 hours as needed.  nitroGLYCERIN (NITROSTAT) 0.4 MG SL tablet Place 0.4 mg under the tongue every 5 minutes as needed. No current facility-administered medications for this visit. Allergies: Allergies   Allergen Reactions    Codeine     Dye [Iodides]     Empirin [Aspirin]      She can take ASA but not EMPIRIN    Ibuprofen      Was told by pcp to never take    Levofloxacin     Lipitor     Other      TB skin test    Pcn [Penicillins]     Sulfa Antibiotics     Talwin [Pentazocine]          Subjective   REVIEW OF SYSTEMS:   CONSTITUTIONAL: no fever, no night sweats,chills,pain, fatigue, unintentional weight loss-20 lbs;  HEENT: blurring of vision, double vision,impaired vision, no hearing difficulty, no tinnitus, no ulceration, no dysplasia, no epistaxis;  LUNGS: cough, no hemoptysis, wheeze, shortness of breath;  CARDIOVASCULAR: no palpitation, no chest pain,shortness of breath;  GI:  abdominal pain, no nausea, vomiting, diarrhea, constipation;  MALINI: no dysuria, no hematuria, no frequency or urgency, no nephrolithiasis;  MUSCULOSKELETAL:back pain, no joint pain, no swelling, no stiffness;  ENDOCRINE: no polyuria, no polydipsia,  Cold and heat intolerance;  HEMATOLOGY: easy bruising, no bleeding, no history of clotting disorder;  DERMATOLOGY: no skin rash, no eczema, no pruritus;  PSYCHIATRY:poor concentration, no depression, anxiety, no panic attacks, no suicidal ideation, no homicidal ideation;  NEUROLOGY:shaky, no syncope, no seizures, no numbness or tingling of hands, no numbness or tingling of feet, no paresis;    Objective   /72   Pulse 80   Wt 87 lb 8 oz (39.7 kg)   SpO2 98%   BMI 15.02 kg/m²       PHYSICAL EXAM:  CONSTITUTIONAL: Alert, appropriate, no acute distress  EYES: Non icteric, EOM intact, pupils equal round   ENT:balance issue, Mucus membranes moist, no oral pharyngeal lesions, external inspection of ears and nose are normal  NECK: Supple, no masses.   No palpable thyroid mass  CHEST/LUNGS: CTA bilaterally, normal respiratory effort   CARDIOVASCULAR: RRR, no murmurs. No lower extremity edema  ABDOMEN: soft non-tender, active bowel sounds, no HSM. No palpable masses  EXTREMITIES: warm, full ROM in all 4 extremities, no focal weakness. SKIN:nails changing, warm, dry with no rashes or lesions  LYMPH: No cervical, clavicular, axillary, or inguinal lymphadenopathy  NEUROLOGIC: follows commands, non focal   PSYCH: mood and affect appropriate. Alert and oriented to time, place, person      LABORATORY RESULTS REVIEWED/ANALYZED BY ME:  4/7/2022 CBC  WBC 6.3  HGB 13.5    Neut 4.4  Lab Results   Component Value Date     (L) 04/07/2022    K 4.0 04/07/2022    CL 96 (L) 04/07/2022    CO2 32 (H) 04/07/2022    BUN 15 04/07/2022    CREATININE 0.8 04/07/2022    GLUCOSE 115 (H) 04/07/2022    CALCIUM 9.2 04/07/2022    PROT 6.3 04/07/2022    LABALBU 3.7 04/07/2022    BILITOT 0.4 04/07/2022    ALKPHOS 105 (H) 04/07/2022    AST 40 (H) 04/07/2022    ALT 18 04/07/2022    LABGLOM >60 04/07/2022    GLOB 2.6 04/07/2022       RADIOLOGY STUDIES REVIEWED BY ME:    Showed new low-density masses throughout the liver that are poorly visualized without contrast measuring up to 3 cm in the left hepatic lobe. 1.6 cm left adrenal nodule measuring 1 HEU, consistent with adenoma. Wall thickening of the right colon and hepatic flexure. Lymph node in the right abdominal mesentery measuring 1 cm.     ASSESSMENT:    Orders Placed This Encounter   Procedures    CT GUIDED NEEDLE PLACEMENT     Standing Status:   Future     Standing Expiration Date:   4/7/2023     Scheduling Instructions:      sched 4/13-PATIENT IS ON PLAVIX & ASPIRIN AND WILL HOLD STARTING TODAY 4/7/22     Order Specific Question:   Reason for exam:     Answer:   diagnostic work-up for new liver lesions-PATIENT IS ON PLAVIX & ASPIRIN AND WILL HOLD STARTING TODAY 4/7/22    Comprehensive Metabolic Panel     Standing Status:   Future Number of Occurrences:   1     Standing Expiration Date:   4/7/2023    Magnesium     Standing Status:   Future     Number of Occurrences:   1     Standing Expiration Date:   4/7/2023    Phosphorus     Standing Status:   Future     Number of Occurrences:   1     Standing Expiration Date:   4/7/2023    Cancer Antigen 19-9     Standing Status:   Future     Number of Occurrences:   1     Standing Expiration Date:   4/7/2023    CEA     Standing Status:   Future     Number of Occurrences:   1     Standing Expiration Date:   4/7/2023      Yusra Vance was seen today for cancer. Diagnoses and all orders for this visit:    Liver mass  -     CT GUIDED NEEDLE PLACEMENT; Future  -     Comprehensive Metabolic Panel; Future  -     Magnesium; Future  -     Phosphorus; Future  -     Cancer Antigen 19-9; Future  -     CEA; Future    Chronic obstructive pulmonary disease, unspecified COPD type [facility])    Care plan discussed with patient    Colon wall thickening  -     CT GUIDED NEEDLE PLACEMENT; Future  -     Comprehensive Metabolic Panel; Future  -     Magnesium; Future  -     Phosphorus; Future  -     Cancer Antigen 19-9; Future  -     CEA; Future    Unintentional weight loss    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 Patient has 20 pound weight loss. She has not had a colonoscopy in a while.   She has some thickening of the right colonic wall.  -She refused colonoscopy  -Certainly concern for metastatic disease  -CEA 11.8/CA 19-9 886  -CT-guided biopsy liver lesion  -RTC in 2 weeks to discuss treatment options after biopsy    History of CABG  -On Plavix/aspirin  -Plavix/aspirin will be on hold for biopsy      PLAN:  · RTC with MD 4/20/22 after biopsy  · Recommend CT guided biopsy liver lesions for diagnosis   · HOLD Plavix and Aspirin x7 days  · CEA, CA 19-9, CMP, CBC      I, Nahid Montalvo, eugenia pre-charting as a registered nurse for Zen Hartmann MD. Electronically signed by Nahid Montalvo RN on 4/7/2022 at 11:33 AM CDT. Leata Hashimoto, am scribing for Zen Hartmann MD. Electronically signed by Nahid Montalvo RN on 4/7/2022 at 12:02 PM CDT. I, Dr Ngoc Fenton, personally performed the services described in this documentation as scribed by Nahid Montalvo RN in my presence and is both accurate and complete. I have seen, examined and reviewed this patient medication list, appropriate labs and imaging studies. I reviewed relevant medical records and others physicians notes. I discussed the plans of care with the patient. I answered all the questions to the patients satisfaction. I have also reviewed the chief complaint (CC) and part of the history (History of Present Illness (HPI), Past Family Social History [facility]), or Review of Systems (ROS) and made changes when appropriated. (Please note that portions of this note were completed with a voice recognition program. Efforts were made to edit the dictations but occasionally words are mis-transcribed.)    Electronically signed by Zen Hartmann MD on 4/7/2022 at 4:04 PM      The total time (62min) I spent to see the patient today includes at least one or more of the following: preparing to see the patient by reviewing prior tests, prior notes or other relevant information, performing appropriate independent examination and evaluation, counseling, ordering of medications, tests or procedures, referrals, communicating with other healthcare professionals when appropriated to coordinate care, documenting clinic information in the electronic medical record or other health records, independently interpreting results of tests, managing test results and communicating the results to the patient/family or caregiver.   The total time (60minutes) I spent to see

## 2022-04-07 ENCOUNTER — HOSPITAL ENCOUNTER (OUTPATIENT)
Dept: INFUSION THERAPY | Age: 82
Discharge: HOME | End: 2022-04-07
Payer: MEDICARE

## 2022-04-07 ENCOUNTER — OFFICE VISIT (OUTPATIENT)
Dept: HEMATOLOGY | Age: 82
End: 2022-04-07
Payer: MEDICARE

## 2022-04-07 VITALS
DIASTOLIC BLOOD PRESSURE: 72 MMHG | OXYGEN SATURATION: 98 % | HEART RATE: 80 BPM | BODY MASS INDEX: 15.02 KG/M2 | WEIGHT: 87.5 LBS | SYSTOLIC BLOOD PRESSURE: 138 MMHG

## 2022-04-07 DIAGNOSIS — J44.9 CHRONIC OBSTRUCTIVE PULMONARY DISEASE, UNSPECIFIED COPD TYPE (HCC): ICD-10-CM

## 2022-04-07 DIAGNOSIS — D37.9 NEOPLASM OF UNCERTAIN BEHAVIOR OF DIGESTIVE ORGAN, UNSPECIFIED: ICD-10-CM

## 2022-04-07 DIAGNOSIS — D37.4 NEOPLASM OF UNCERTAIN BEHAVIOR OF COLON: ICD-10-CM

## 2022-04-07 DIAGNOSIS — R16.0 LIVER MASS: ICD-10-CM

## 2022-04-07 DIAGNOSIS — K63.9 COLON WALL THICKENING: ICD-10-CM

## 2022-04-07 DIAGNOSIS — R63.4 UNINTENTIONAL WEIGHT LOSS: ICD-10-CM

## 2022-04-07 DIAGNOSIS — Z71.89 CARE PLAN DISCUSSED WITH PATIENT: ICD-10-CM

## 2022-04-07 DIAGNOSIS — R16.0 LIVER MASS: Primary | ICD-10-CM

## 2022-04-07 LAB
ALBUMIN SERPL-MCNC: 3.7 G/DL (ref 3.5–5.2)
ALP BLD-CCNC: 105 U/L (ref 35–104)
ALT SERPL-CCNC: 18 U/L (ref 9–52)
ANION GAP SERPL CALCULATED.3IONS-SCNC: 8 MMOL/L (ref 7–19)
AST SERPL-CCNC: 40 U/L (ref 14–36)
BILIRUB SERPL-MCNC: 0.4 MG/DL (ref 0.2–1.3)
BUN BLDV-MCNC: 15 MG/DL (ref 7–17)
CA 19-9: 886 U/ML (ref 0–35)
CALCIUM SERPL-MCNC: 9.2 MG/DL (ref 8.4–10.2)
CEA: 11.8 NG/ML (ref 0–4.7)
CHLORIDE BLD-SCNC: 96 MMOL/L (ref 98–111)
CO2: 32 MMOL/L (ref 22–29)
CREAT SERPL-MCNC: 0.8 MG/DL (ref 0.5–1)
GFR NON-AFRICAN AMERICAN: >60
GLOBULIN: 2.6 G/DL
GLUCOSE BLD-MCNC: 115 MG/DL (ref 74–106)
HCT VFR BLD CALC: 41 % (ref 34.1–44.9)
HEMOGLOBIN: 13.5 G/DL (ref 11.2–15.7)
MAGNESIUM: 2.3 MG/DL (ref 1.6–2.3)
MCH RBC QN AUTO: 31.4 PG (ref 25.6–32.2)
MCHC RBC AUTO-ENTMCNC: 32.9 G/DL (ref 32.3–35.5)
MCV RBC AUTO: 95.3 FL (ref 79.4–94.8)
PDW BLD-RTO: 14.1 % (ref 11.7–14.4)
PHOSPHORUS: 2.1 MG/DL (ref 2.5–4.5)
PLATELET # BLD: 247 K/UL (ref 182–369)
PMV BLD AUTO: 8.3 FL (ref 7.4–10.4)
POTASSIUM SERPL-SCNC: 4 MMOL/L (ref 3.5–5.1)
RBC # BLD: 4.3 M/UL (ref 3.93–5.22)
SODIUM BLD-SCNC: 136 MMOL/L (ref 137–145)
TOTAL PROTEIN: 6.3 G/DL (ref 6.3–8.2)
WBC # BLD: 6.3 K/UL (ref 3.98–10.04)

## 2022-04-07 PROCEDURE — 85027 COMPLETE CBC AUTOMATED: CPT

## 2022-04-07 PROCEDURE — 84100 ASSAY OF PHOSPHORUS: CPT

## 2022-04-07 PROCEDURE — 3023F SPIROM DOC REV: CPT | Performed by: INTERNAL MEDICINE

## 2022-04-07 PROCEDURE — 99212 OFFICE O/P EST SF 10 MIN: CPT

## 2022-04-07 PROCEDURE — 4004F PT TOBACCO SCREEN RCVD TLK: CPT | Performed by: INTERNAL MEDICINE

## 2022-04-07 PROCEDURE — G8419 CALC BMI OUT NRM PARAM NOF/U: HCPCS | Performed by: INTERNAL MEDICINE

## 2022-04-07 PROCEDURE — G8427 DOCREV CUR MEDS BY ELIG CLIN: HCPCS | Performed by: INTERNAL MEDICINE

## 2022-04-07 PROCEDURE — 36415 COLL VENOUS BLD VENIPUNCTURE: CPT

## 2022-04-07 PROCEDURE — 99205 OFFICE O/P NEW HI 60 MIN: CPT | Performed by: INTERNAL MEDICINE

## 2022-04-07 PROCEDURE — 4040F PNEUMOC VAC/ADMIN/RCVD: CPT | Performed by: INTERNAL MEDICINE

## 2022-04-07 PROCEDURE — 1090F PRES/ABSN URINE INCON ASSESS: CPT | Performed by: INTERNAL MEDICINE

## 2022-04-07 PROCEDURE — G8400 PT W/DXA NO RESULTS DOC: HCPCS | Performed by: INTERNAL MEDICINE

## 2022-04-07 PROCEDURE — 1123F ACP DISCUSS/DSCN MKR DOCD: CPT | Performed by: INTERNAL MEDICINE

## 2022-04-07 PROCEDURE — 83735 ASSAY OF MAGNESIUM: CPT

## 2022-04-07 PROCEDURE — 80053 COMPREHEN METABOLIC PANEL: CPT

## 2022-04-13 ENCOUNTER — HOSPITAL ENCOUNTER (OUTPATIENT)
Dept: CT IMAGING | Age: 82
Discharge: HOME | End: 2022-04-13
Payer: MEDICARE

## 2022-04-13 VITALS
TEMPERATURE: 97 F | DIASTOLIC BLOOD PRESSURE: 57 MMHG | SYSTOLIC BLOOD PRESSURE: 105 MMHG | HEART RATE: 88 BPM | OXYGEN SATURATION: 99 % | WEIGHT: 87 LBS | HEIGHT: 64 IN | RESPIRATION RATE: 16 BRPM | BODY MASS INDEX: 14.85 KG/M2

## 2022-04-13 DIAGNOSIS — R16.0 LIVER MASS: ICD-10-CM

## 2022-04-13 DIAGNOSIS — K63.9 COLON WALL THICKENING: ICD-10-CM

## 2022-04-13 LAB
INR BLD: 0.98 (ref 0.88–1.18)
PLATELET # BLD: 256 K/UL (ref 130–400)
PROTHROMBIN TIME: 12.9 SEC (ref 12–14.6)

## 2022-04-13 PROCEDURE — 88334 PATH CONSLTJ SURG CYTO XM EA: CPT

## 2022-04-13 PROCEDURE — 85049 AUTOMATED PLATELET COUNT: CPT

## 2022-04-13 PROCEDURE — 47000 NEEDLE BIOPSY OF LIVER PERQ: CPT

## 2022-04-13 PROCEDURE — 88185 FLOWCYTOMETRY/TC ADD-ON: CPT

## 2022-04-13 PROCEDURE — 88333 PATH CONSLTJ SURG CYTO XM 1: CPT

## 2022-04-13 PROCEDURE — 88184 FLOWCYTOMETRY/ TC 1 MARKER: CPT

## 2022-04-13 PROCEDURE — 88342 IMHCHEM/IMCYTCHM 1ST ANTB: CPT

## 2022-04-13 PROCEDURE — 77012 CT SCAN FOR NEEDLE BIOPSY: CPT

## 2022-04-13 PROCEDURE — 85610 PROTHROMBIN TIME: CPT

## 2022-04-13 PROCEDURE — 88307 TISSUE EXAM BY PATHOLOGIST: CPT

## 2022-04-13 PROCEDURE — 88341 IMHCHEM/IMCYTCHM EA ADD ANTB: CPT

## 2022-04-13 RX ORDER — FENTANYL CITRATE 50 UG/ML
INJECTION, SOLUTION INTRAMUSCULAR; INTRAVENOUS
Status: DISCONTINUED
Start: 2022-04-13 | End: 2022-04-13 | Stop reason: WASHOUT

## 2022-04-13 RX ORDER — MIDAZOLAM HYDROCHLORIDE 1 MG/ML
INJECTION INTRAMUSCULAR; INTRAVENOUS
Status: DISCONTINUED
Start: 2022-04-13 | End: 2022-04-13 | Stop reason: WASHOUT

## 2022-04-13 RX ORDER — SODIUM CHLORIDE 0.9 % (FLUSH) 0.9 %
10 SYRINGE (ML) INJECTION PRN
Status: DISCONTINUED | OUTPATIENT
Start: 2022-04-13 | End: 2022-04-15 | Stop reason: HOSPADM

## 2022-04-13 NOTE — PROGRESS NOTES
Pt is being discharged to home with a copy of her discharge instructions. Pt verbalized understanding of all instructions. Puncture site soft without evidence of bleeding or hematoma. Vitals stable. Pt being discharged in stable condition.

## 2022-04-13 NOTE — PROGRESS NOTES
Pt ambulated to the bathroom. Pt steady on her feet. Pt reported that she had a normal bowel and bladder movement. Pt denies any pain. Puncture site remains soft without evidence of bleeding.

## 2022-04-13 NOTE — PRE SEDATION
Sedation Pre-Procedure Note    Patient Name: Val Mcleod   YOB: 1940  Room/Bed: Room/bed info not found  Medical Record Number: [id number]  Date: 4/13/2022   Time: 11:48 AM       Indication:  Liver masses    Consent: I have discussed with the patient and/or the patient representative the indication, alternatives, and the possible risks and/or complications of the planned procedure and the anesthesia methods. The patient and/or patient representative appear to understand and agree to proceed. Vital Signs:   Vitals:    04/13/22 1142   BP: 138/72   Pulse: 83   Resp: 20   Temp:    SpO2: 99%       Past Medical History:   has a past medical history of Anemia, Basal cell carcinoma, CAD (coronary artery disease), Carotid bruit, Chronic anemia, Chronic bronchitis (HCC), COPD (chronic obstructive pulmonary disease) (HCC), Hip fx ([address] History of heart artery stent, History of hip surgery, Hyperlipidemia, Hyperlipidemia, Hypertension, Liver mass, Neuropathy, Osteoporosis, PONV (postoperative nausea and vomiting), and Renal artery stenosis ([address] Past Surgical History:   has a past surgical history that includes Coronary angioplasty with stent (2010); Ankle fracture surgery (Left, 2005); Coronary artery bypass graft (1998); hernia repair (1/27/14); Cholecystectomy (1/27/14); Ankle surgery (Left); Cardiac catheterization (08/2012); shoulder surgery (Right); Coronary artery bypass graft (1998); hip surgery (2020); and Skin cancer excision (2020). Medications:   Scheduled Meds:    midazolam        fentaNYL         Continuous Infusions:   PRN Meds: sodium chloride flush  Home Meds:   Prior to Admission medications    Medication Sig Start Date End Date Taking? Authorizing Provider   traMADol (ULTRAM) 50 MG tablet Take 50 mg by mouth 2 times daily.     Historical Provider, MD   Fluticasone furoate-vilanterol (BREO ELLIPTA) 200-25 MCG/INH AEPB inhaler Inhale into the lungs daily    Historical Provider, MD OXYGEN Inhale into the lungs    Historical Provider, MD   Omega 3 1200 MG CAPS Take by mouth    Historical Provider, MD   pravastatin (PRAVACHOL) 80 MG tablet Take 80 mg by mouth daily    Historical Provider, MD   ALPRAZolam (XANAX) 0.25 MG tablet Take 0.25 mg by mouth 3 times daily. 8/11/13   Historical Provider, MD   carvedilol (COREG) 12.5 MG tablet Take 12.5 mg by mouth 2 times daily  7/26/13   Historical Provider, MD   clopidogrel (PLAVIX) 75 MG tablet Take 75 mg by mouth daily  7/24/13   Historical Provider, MD   triamterene-hydrochlorothiazide (DYAZIDE) 37.5-25 MG per capsule Take 1 capsule by mouth three times a week  9/26/13   Historical Provider, MD   Misc Natural Products (OSTEO BI-FLEX ADV JOINT SHIELD PO) Take  by mouth. Historical Provider, MD   vitamin D (CHOLECALCIFEROL) 1000 UNIT TABS tablet Take 1,000 Units by mouth daily. Historical Provider, MD   aspirin 81 MG chewable tablet Take 81 mg by mouth daily. Historical Provider, MD   leval[facility]erol [facility]) 45 MCG/ACT inhaler Inhale 1-2 puffs into the lungs every 4 hours as needed. Historical Provider, MD   nitroGLYCERIN (NITROSTAT) 0.4 MG SL tablet Place 0.4 mg under the tongue every 5 minutes as needed. Historical Provider, MD     Coumadin Use Last 7 Days:  No  Antiplatelet drug therapy use last 7 days: no-  Other anticoagulant use last 7 days: no  Additional Medication Information:  none      Pre-Sedation Documentation and Exam:   I have reviewed the patient's history and review of systems.     Mallampati Airway Assessment:  Mallampati Class II - (soft palate, fauces & uvula are visible)    Prior History of Anesthesia Complications:   vomiting with anesthesia administration    ASA Classification:  Class 2 - A normal healthy patient with mild systemic disease    Sedation/ Anesthesia Plan:   Regional, IV sedation only if necessary    Medications Planned:   Regional with Lidocaine; if needed midazolam (Versed) and Fentanyl intravenously    Patient is an appropriate candidate for plan of sedation: yes    Electronically signed by Manpreet Carlisle MD on 4/13/2022 at 11:48 AM

## 2022-04-13 NOTE — PROGRESS NOTES
Patient tolerated the procedure well. Vitals remained stable. No sedation was used. Pathology was in room for procedure and samples were sent with them. Orders received. Puncture site clean and dry. Patient daughter at bedside. Patient monitored from bedside monitor and viewed at nursing station.

## 2022-04-18 NOTE — PROGRESS NOTES
MEDICAL ONCOLOGY PROGRESS NOTE    Pt Name: Katie Littlejohn  MRN: [id number]  [place]: 1940  Date of evaluation: 4/20/2022  History Obtained From:  patient and old medical records    HISTORY OF PRESENT ILLNESS:    The patient was found to have liver masses. She underwent a CT-guided biopsy. Pathology consistent with small cell carcinoma. She is here to discuss treatment recommendations. She denies any new complaints. 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 The patient had complaints of right abdominal quadrant pain. In addition, 20 pound weight loss. She underwent imaging studies at outside facility, [facility].  · 3/8/22 MRI brain w/o ([facility]): No acute intracranial abnormalities. Mild vascular loss and mild chronic microvascular changes. · 3/30/22 CT abd/pelvis w/o [facility]): new low-density masses throughout the liver that are poorly visualized without contrast measuring up to 3 cm in the left hepatic lobe. 1.6 cm left adrenal nodule measuring 1 HEU, consistent with adenoma. Wall thickening of the right colon and hepatic flexure. Lymph node in the right abdominal mesentery measuring 1 cm. · 3/30/22 CT chest w/o [facility]): Bilateral lower lobe lung nodules measuring up to 3mm, very low suspicion for malignancy. 1.3 cm subcarinal node. Calcific atherosclerosis, status post coronary bypass. Colonic diverticulosis. Emphysema. · 4/7/2022she was first seen by me. Recommend a CT-guided biopsy of liver lesions. · 4/7/2022CA 19-9 886, CEA 11.8  · 4/13/2022 Liver Right lobe biopsy: No immunophenotypic evidence of a clonal B-cell population or an abnormal T-cell population in this low viable sample.  However,majority of the cells( about 90%) is CD45 negative cells within also expression of CD56. Sections show multiple small fragments consisting of uniform small round blue cells with minimal cytoplasm and dark smudgy chromatin.  There are areas of crush injury. Immunohistochemical stains for CD56, synaptophysin, TTF-1, and CK7 show   positive staining for CD56, TTF-1 and synaptophysin.  The controls are good.  These are monoclonal antibody stains, polyclonal antibody stains are not utilized. · 4/20/2022essentially, the patient has findings of small cell carcinoma in the liver. This is consistent with a stage IV. I am unaware of a primary liver small cell carcinoma. Therefore, this is likely metastatic from other site, likely lung. Recommended palliative chemotherapy and immunotherapy. Discussed risk/benefit.     Past Medical History:    Past Medical History:   Diagnosis Date    Anemia     Basal cell carcinoma     face    CAD (coronary artery disease)     Carotid bruit     left    Chronic anemia     Chronic bronchitis (HCC)     COPD (chronic obstructive pulmonary disease) (HCC)     Hip fx (HCC)     left    History of heart artery stent     History of hip surgery 09/27/2020    at [facility] hip    Hyperlipidemia     Hyperlipidemia     Hypertension     Liver mass     Neuropathy     Osteoporosis     PONV (postoperative nausea and vomiting)     after each surgery    Renal artery stenosis (HCC) carotid stenosis       Past Surgical History:    Past Surgical History:   Procedure Laterality Date    ANKLE FRACTURE SURGERY Left 2005    ANKLE SURGERY Left     CABG WITH AORTIC VALVE REPAIR  1998    CARDIAC CATHETERIZATION  08/2012    Dr. Rosalia Ponce  1/27/14    CORONARY ANGIOPLASTY WITH STENT PLACEMENT  2010    Dr Idris Thurman  1/27/14    suture repair of subxiphoid trocar hernia     HIP SURGERY  2020    left hip    SHOULDER SURGERY Right     SKIN CANCER EXCISION  2020    on nose        Social History:    Marital status:   Smoking status: Former; Quit in [address]  ETOH status:No  Resides: [place], [place]    Family History:   No family history on file. Current Hospital Medications:    Current Outpatient Medications   Medication Sig Dispense Refill    memantine (NAMENDA) 10 MG tablet Take 1 tablet by mouth 2 times daily 60 tablet 5    traMADol (ULTRAM) 50 MG tablet Take 50 mg by mouth 2 times daily.  Fluticasone furoate-vilanterol (BREO ELLIPTA) 200-25 MCG/INH AEPB inhaler Inhale into the lungs daily      OXYGEN Inhale into the lungs      Omega 3 1200 MG CAPS Take by mouth      pravastatin (PRAVACHOL) 80 MG tablet Take 80 mg by mouth daily      ALPRAZolam (XANAX) 0.25 MG tablet Take 0.25 mg by mouth 3 times daily.  carvedilol (COREG) 12.5 MG tablet Take 12.5 mg by mouth 2 times daily       clopidogrel (PLAVIX) 75 MG tablet Take 75 mg by mouth daily       triamterene-hydrochlorothiazide (DYAZIDE) 37.5-25 MG per capsule Take 1 capsule by mouth three times a week       Misc Natural Products (OSTEO BI-FLEX ADV JOINT SHIELD PO) Take  by mouth.  vitamin D (CHOLECALCIFEROL) 1000 UNIT TABS tablet Take 1,000 Units by mouth daily.  aspirin 81 MG chewable tablet Take 81 mg by mouth daily.  levalbuterol (XOPENEX HFA) 45 MCG/ACT inhaler Inhale 1-2 puffs into the lungs every 4 hours as needed.  nitroGLYCERIN (NITROSTAT) 0.4 MG SL tablet Place 0.4 mg under the tongue every 5 minutes as needed. No current facility-administered medications for this visit. Allergies:    Allergies   Allergen Reactions    Codeine     Dye [Iodides]     Empirin [Aspirin]      She can take ASA but not EMPIRIN    Ibuprofen      Was told by pcp to never take    Levofloxacin     Lipitor     Other      TB skin test    Pcn [Penicillins]     Sulfa Antibiotics     Talwin [Pentazocine]          Subjective   REVIEW OF SYSTEMS:   CONSTITUTIONAL: no fever, no night sweats, no fatigue,right rib pain;  HEENT: no blurring of vision, no double vision, no hearing difficulty, no tinnitus, no ulceration, no dysplasia, no epistaxis;   LUNGS: no cough, no hemoptysis, no wheeze,  no shortness of breath;  CARDIOVASCULAR: no palpitation, no chest pain, no shortness of breath;  GI: no abdominal pain,nausea, vomiting, diarrhea, constipation;  MALINI: no dysuria, no hematuria, no frequency or urgency, no nephrolithiasis;  MUSCULOSKELETAL: no joint pain, no swelling, no stiffness;  ENDOCRINE: no polyuria, no polydipsia, no cold or heat intolerance;  HEMATOLOGY: no easy bruising or bleeding, no history of clotting disorder;  DERMATOLOGY: no skin rash, no eczema, no pruritus;  PSYCHIATRY: no depression, no anxiety, no panic attacks, no suicidal ideation, no homicidal ideation;  NEUROLOGY:difficulty sleeping, no syncope, no seizures, no numbness or tingling of hands, no numbness or tingling of feet, no paresis;    Objective   /80   Pulse 95   Ht 5' 4\" (1.626 m)   Wt 90 lb (40.8 kg)   SpO2 99%   BMI 15.45 kg/m²     PHYSICAL EXAM:  CONSTITUTIONAL: Alert, appropriate, no acute distress  EYES: Non icteric, EOM intact, pupils equal round   ENT: Mucus membranes moist, no oral pharyngeal lesions, external inspection of ears and nose are normal.  NECK: Supple, no masses. No palpable thyroid mass  CHEST/LUNGS: CTA bilaterally, normal respiratory effort   CARDIOVASCULAR: RRR, no murmurs. No lower extremity edema  ABDOMEN: soft non-tender, active bowel sounds, no HSM. No palpable masses  EXTREMITIES: warm, full ROM in all 4 extremities, no focal weakness. SKIN: warm, dry with no rashes or lesions  LYMPH: No cervical, clavicular, axillary, or inguinal lymphadenopathy  NEUROLOGIC: follows commands, non focal   PSYCH: mood and affect appropriate.   Alert and oriented to time, place, person      LABORATORY RESULTS REVIEWED/ANALYZED BY ME:  Lab Results   Component Value Date     (L) 04/07/2022    K 4.0 04/07/2022    CL 96 (L) 04/07/2022    CO2 32 (H) 04/07/2022    BUN 15 04/07/2022    CREATININE 0.8 04/07/2022    GLUCOSE 115 (H) 04/07/2022    CALCIUM 9.2 04/07/2022    PROT 6.3 04/07/2022    LABALBU 3.7 04/07/2022    BILITOT 0.4 04/07/2022    ALKPHOS 105 (H) 04/07/2022    AST 40 (H) 04/07/2022    ALT 18 04/07/2022    LABGLOM >60 04/07/2022    GLOB 2.6 04/07/2022     Lab Results   Component Value Date    WBC 7.00 04/20/2022    HGB 13.2 04/20/2022    HCT 41.2 04/20/2022    MCV 96.7 (H) 04/20/2022     04/20/2022     Lab Results   Component Value Date    NEUTROABS 6.03 01/20/2014 4/13/2022 Liver Right lobe biopsy: No immunophenotypic evidence of a clonal B-cell population or an abnormal T-cell population in this low viable sample. However,majority of the cells( about 90%) is CD45 negative cells within also expression of CD 56.  Sections show multiple small fragments consisting of uniform small round blue cells with minimal cytoplasm and dark smudgy chromatin.  There are areas of crush injury. Immunohistochemical stains for CD56, synaptophysin, TTF-1, and CK7 show  positive staining for CD56, TTF-1 and synaptophysin.  The controls are good.  These are monoclonal antibody stains, polyclonal antibody stains are not utilized. Lab Results   Component Value Date    WBC 7.00 04/20/2022    HGB 13.2 04/20/2022    HCT 41.2 04/20/2022    MCV 96.7 (H) 04/20/2022     04/20/2022     RADIOLOGY STUDIES REVIEWED BY ME:  None    ASSESSMENT:    No orders of the defined types were placed in this encounter. Joe Francis was seen today for follow-up. Diagnoses and all orders for this visit:    Liver mass    Colon wall thickening    Care plan discussed with patient    Chemotherapy-induced nausea       Small cell carcinoma (liver metastasis (  Essentially, incidental finding of numerous liver lesions. Patient has 20 pound weight loss. She has not had a colonoscopy in a while.   She has some thickening of the right colonic wall.  -She refused colonoscopy  -Certainly concern for metastatic disease  -CEA 11.8/CA 19-9 886  -CT-guided biopsy liver lesion-consistent with small cell carcinoma  4/13/2022 Liver Right lobe biopsy: No immunophenotypic evidence of a clonal B-cell population or an abnormal T-cell population in this low viable sample. However,majority of the cells( about 90%) is CD45 negative cells within also expression of CD 56.  Sections show multiple small fragments consisting of uniform small round blue cells with minimal cytoplasm and dark smudgy chromatin.  There are areas of crush injury. Immunohistochemical stains for CD56, synaptophysin, TTF-1, and CK7 show  positive staining for CD56, TTF-1 and synaptophysin.  The controls are good.  These are monoclonal antibody stains, polyclonal antibody stains are not utilized. Findings consistent with small cell carcinoma      Recommended palliative chemotherapy  Carboplatin AUC 4-day 1  Etoposide 80 mg/m2 day 1-3  Tecentriq day 1  Every 3 weeks  G-CSF support  X4 cycles  To be followed by maintenance with Tecentriq    History of CABG  -On Plavix/aspirin  -Plavix/aspirin      PLAN:  · RTC with MD PRUITT#2  · Recommend Carboetoposide Tecentriq- when ins approves  · Recommend Neulasta to prevent WBC dropping  · Education given to patient  · Encouraged to drink water prior to treatment  · Recommend Zofran 4 mg when needed  · A significant amount of time was spent on educating the patient about chemotherapy and treatment options          Kenneth BURNS am pre charting  as Medical Assistant for Vicky Simmons MD. Electronically signed by Kenneth Norris MA on 4/20/2022 at 12:30 PM CDT. Joanne Hayes am scribing as Medical Assistant for Vicky Simmons MD. Electronically signed by Kenneth Norris MA on 4/20/2022 at 2:36 PM CDT.       I, Dr Ashu Wilburn, personally performed the services described in this documentation as scribed by Kenneth Norris MA in my presence and is both accurate and complete. I have seen, examined and reviewed this patient medication list, appropriate labs and imaging studies. I reviewed relevant medical records and others physicians notes. I discussed the plans of care with the patient. I answered all the questions to the patients satisfaction. I have also reviewed the chief complaint (CC) and part of the history (History of Present Illness (HPI), Past Family Social History [facility]), or Review of Systems (ROS) and made changes when appropriated. (Please note that portions of this note were completed with a voice recognition program. Efforts were made to edit the dictations but occasionally words are mis-transcribed. )Electronically signed by Eduardo Medina MD on 4/20/2022 at 2:46 PM         The total time (35min) I spent to see the patient today includes at least one or more of the following: preparing to see the patient by reviewing prior tests, prior notes or other relevant information, performing appropriate independent examination and evaluation, counseling, ordering of medications, documenting clinic information in the electronic medical record or other health records, independently interpreting results of tests, managing test results and communicating the results to the patient/family or caregiver.

## 2022-04-19 ENCOUNTER — OFFICE VISIT (OUTPATIENT)
Dept: NEUROSURGERY | Age: 82
End: 2022-04-19
Payer: MEDICARE

## 2022-04-19 VITALS
WEIGHT: 87 LBS | HEIGHT: 64 IN | BODY MASS INDEX: 14.85 KG/M2 | SYSTOLIC BLOOD PRESSURE: 108 MMHG | HEART RATE: 77 BPM | OXYGEN SATURATION: 97 % | DIASTOLIC BLOOD PRESSURE: 64 MMHG

## 2022-04-19 DIAGNOSIS — R41.3 MEMORY LOSS: Primary | ICD-10-CM

## 2022-04-19 PROCEDURE — 1090F PRES/ABSN URINE INCON ASSESS: CPT | Performed by: PSYCHIATRY & NEUROLOGY

## 2022-04-19 PROCEDURE — 1123F ACP DISCUSS/DSCN MKR DOCD: CPT | Performed by: PSYCHIATRY & NEUROLOGY

## 2022-04-19 PROCEDURE — G8419 CALC BMI OUT NRM PARAM NOF/U: HCPCS | Performed by: PSYCHIATRY & NEUROLOGY

## 2022-04-19 PROCEDURE — G8400 PT W/DXA NO RESULTS DOC: HCPCS | Performed by: PSYCHIATRY & NEUROLOGY

## 2022-04-19 PROCEDURE — 99214 OFFICE O/P EST MOD 30 MIN: CPT | Performed by: PSYCHIATRY & NEUROLOGY

## 2022-04-19 PROCEDURE — 4040F PNEUMOC VAC/ADMIN/RCVD: CPT | Performed by: PSYCHIATRY & NEUROLOGY

## 2022-04-19 PROCEDURE — G8427 DOCREV CUR MEDS BY ELIG CLIN: HCPCS | Performed by: PSYCHIATRY & NEUROLOGY

## 2022-04-19 PROCEDURE — 1036F TOBACCO NON-USER: CPT | Performed by: PSYCHIATRY & NEUROLOGY

## 2022-04-19 RX ORDER — MEMANTINE HYDROCHLORIDE 10 MG/1
10 TABLET ORAL 2 TIMES DAILY
Qty: 60 TABLET | Refills: 5 | Status: SHIPPED | OUTPATIENT
Start: 2022-04-19

## 2022-04-19 NOTE — PROGRESS NOTES
[facility] Neurology Office Note      Patient:   Val Mcleod  MR#:    [id number]  Account Number:                         YOB: 1940  Date of Evaluation:  4/19/2022  Time of Note:                          9:27 AM  Primary/Referring Physician:  Karin Duffy MD   Consulting Physician:  Vasquez Amos DO    FOLLOW UP VISIT    Chief Complaint   Patient presents with    Memory Loss     2 Month follow up        [facility]    Val Mcleod is a 80y.o. year old female here for dizziness, hallucinations. Symptoms started a few years ago after prior possible TBI, concussion. Noting some visual difficulty in the left eye as well chronically. She notes visual hallucinations. Some cognitive loss noted as well. All largely the same since last seen. No tremor. Notes some dizziness intermittently. Some balance difficulty noted as well. CBC, CMP, TSH, B12 normal.  No history of depression, some anxiety noted, on xanax but only at 0.25 mg dosing. History of CAD, CABG and stent placement noted. Recent CT negative. MRI, EEG, labs as below. Recent liver biopsy, results pending.      Past Medical History:   Diagnosis Date    Anemia     Basal cell carcinoma     face    CAD (coronary artery disease)     Carotid bruit     left    Chronic anemia     Chronic bronchitis (HCC)     COPD (chronic obstructive pulmonary disease) (HCC)     Hip fx (HCC)     left    History of heart artery stent     History of hip surgery 09/27/2020    at [facility] left hip    Hyperlipidemia     Hyperlipidemia     Hypertension     Liver mass     Neuropathy     Osteoporosis     PONV (postoperative nausea and vomiting)     after each surgery    Renal artery stenosis (HCC) carotid stenosis       Past Surgical History:   Procedure Laterality Date    ANKLE FRACTURE SURGERY Left 2005    ANKLE SURGERY Left     CABG WITH AORTIC VALVE REPAIR  1998    CARDIAC CATHETERIZATION  08/2012    Dr. Joseph Perez 14    CORONARY ANGIOPLASTY WITH STENT PLACEMENT      Dr Kristen Juarez  14    suture repair of subxiphoid trocar hernia     HIP SURGERY      left hip    SHOULDER SURGERY Right     SKIN CANCER EXCISION      on nose        History reviewed. No pertinent family history. Social History     Socioeconomic History    Marital status:      Spouse name: Not on file    Number of children: Not on file    Years of education: Not on file    Highest education level: Not on file   Occupational History    Not on file   Tobacco Use    Smoking status: Former Smoker     Packs/day: 1.00     Years: 30.00     Pack years: 30.00     Quit date:      Years since quittin.3    Smokeless tobacco: Never Used   Vaping Use    Vaping Use: Never used   Substance and Sexual Activity    Alcohol use: No    Drug use: Never    Sexual activity: Not Currently   Other Topics Concern    Not on file   Social History Narrative    Not on file     Social Determinants of Health     Financial Resource Strain:     Difficulty of Paying Living Expenses: Not on file   Food Insecurity:     Worried About [address] in the Last Year: Not on file    [address] in the Last Year: Not on file   Transportation Needs:     Lack of Transportation (Medical): Not on file    Lack of Transportation (Non-Medical):  Not on file   Physical Activity:     Days of Exercise per Week: Not on file    Minutes of Exercise per Session: Not on file   Stress:     Feeling of Stress : Not on file   Social Connections:     Frequency of Communication with Friends and Family: Not on file    Frequency of Social Gatherings with Friends and Family: Not on file    Attends Zoroastrianism Services: Not on file    Active Member of Clubs or Organizations: Not on file    Attends Club or Organization Meetings: Not on file    Marital Status: Not on file   Intimate Partner Violence:     Fear of Current or Ex-Partner: Not on file    Emotionally Abused: Not on file    Physically Abused: Not on file    Sexually Abused: Not on file   Housing Stability:     Unable to Pay for Housing in the Last Year: Not on file    Number of Karolina in the Last Year: Not on file    Unstable Housing in the Last Year: Not on file       Current Outpatient Medications   Medication Sig Dispense Refill    traMADol (ULTRAM) 50 MG tablet Take 50 mg by mouth 2 times daily.  Fluticasone furoate-vilanterol (BREO ELLIPTA) 200-25 MCG/INH AEPB inhaler Inhale into the lungs daily      OXYGEN Inhale into the lungs      Omega 3 1200 MG CAPS Take by mouth      pravastatin (PRAVACHOL) 80 MG tablet Take 80 mg by mouth daily      ALPRAZolam (XANAX) 0.25 MG tablet Take 0.25 mg by mouth 3 times daily.  carvedilol (COREG) 12.5 MG tablet Take 12.5 mg by mouth 2 times daily       clopidogrel (PLAVIX) 75 MG tablet Take 75 mg by mouth daily       triamterene-hydrochlorothiazide (DYAZIDE) 37.5-25 MG per capsule Take 1 capsule by mouth three times a week       Misc Natural Products (OSTEO BI-FLEX ADV JOINT SHIELD PO) Take  by mouth.  vitamin D (CHOLECALCIFEROL) 1000 UNIT TABS tablet Take 1,000 Units by mouth daily.  aspirin 81 MG chewable tablet Take 81 mg by mouth daily.  levalbuterol (XOPENEX HFA) 45 MCG/ACT inhaler Inhale 1-2 puffs into the lungs every 4 hours as needed.  nitroGLYCERIN (NITROSTAT) 0.4 MG SL tablet Place 0.4 mg under the tongue every 5 minutes as needed. No current facility-administered medications for this visit. Allergies   Allergen Reactions    Codeine     Dye [Iodides]     Empirin [Aspirin]      She can take ASA but not EMPIRIN    Ibuprofen      Was told by pcp to never take    Levofloxacin     Lipitor     Other      TB skin test    Pcn [Penicillins]     Sulfa Antibiotics     Talwin [Pentazocine]          REVIEW OF SYSTEMS     Constitutional: []? Fever []?Sweat []? Chills []? Recent Injury [x]? Denies all unless marked  HEENT:[]? Headache  []? Head Injury/Hearing Loss  []? Sore Throat  []? Ear Ache/Dizziness  [x]? Denies all unless marked  Spine:  []? Neck pain  []? Back pain  []? Sciaticia  [x]? Denies all unless marked  Cardiovascular:[]? Heart Disease []? Chest Pain []? Palpitations  [x]? Denies all unless marked  Pulmonary: []? Shortness of Breath []? Cough   [x]? Denies all unless marke  Gastrointestinal: []? Nausea  []? Vomiting  []? Abdominal Pain  []? Constipation  []? Diarrhea  []? Dark Bloody Stools  [x]? Denies all unless marked  Psychiatric/Behavioral:[]? Depression []? Anxiety [x]? Denies all unless marked  Genitourinary:   []? Frequency  []? Urgency  []? Incontinence []? Pain with Urination  [x]? Denies all unless marked  Extremities: []? Pain  []? Swelling  [x]? Denies all unless marked  Musculoskeletal: []? Muscle Pain  []? Joint Pain  []? Arthritis []? Muscle Cramps []? Muscle Twitches  [x]? Denies all unless marked  Sleep: []? Insomnia []? Snoring []? Restless Legs []? Sleep Apnea  []? Daytime Sleepiness  [x]? Denies all unless marked  Skin:[]? Rash []? Skin Discoloration [x]? Denies all unless marked   Neurological: []? Visual Disturbance/Memory Loss []? Loss of Balance []? Slurred Speech/Weakness []? Seizures  []? Vertigo/Dizziness [x]? Denies all unless marked    I have reviewed the above ROS with the patient and agree with the ROS as documented above.          PHYSICAL EXAM    Constitutional    /64   Pulse 77   Ht 5' 4\" (1.626 m)   Wt 87 lb (39.5 kg)   SpO2 97%   BMI 14.93 kg/m²   General appearance: No acute distress   EYES -   Conjunctiva normal  Pupillary exam as below, see CN exam in the neurologic exam  ENT-    No scars, masses, or lesions over external nose or ears  Hearing normal bilaterally to finger rub  Cardiovascular -   No clubbing, cyanosis, or edema   Pulmonary-   Good expansion, normal effort without use of accessory muscles  Musculoskeletal    No significant wasting of muscles noted  Gait as below, see gait exam in the neurologic exam  Muscle strength, tone, stability as below. No bony deformities  Skin    Warm, dry, and intact to inspection and palpation. No rash, erythema, or pallor  Psychiatric    Mood, affect, and behavior appear normal    Memory as below see mental status examination in the neurologic exam    NEUROLOGICAL EXAM    Mental status   [x]Awake, alert, oriented   [x]Affect attention and concentration appear appropriate  []Recent and remote memory appears unremarkable  [x]Speech normal without dysarthria or aphasia, comprehension and repetition intact. COMMENTS:   Prior 18/30 MoCA - unchanged   Cranial Nerves [x]No VF deficit to confrontation  [x]PERRLA, EOMI, no nystagmus, conjugate eye movements, no ptosis  [x]Face symmetric  [x]Facial sensation intact  [x]Tongue midline no atrophy or fasciculations present  [x]Palate midline, hearing to finger rub normal bilaterally  [x]Shoulder shrug and SCM testing normal bilaterally  COMMENTS:   Motor   [x]5/5 strength x 4 extremities  [x]Normal bulk and tone  [x]No tremor present  [x]No rigidity or bradykinesia noted  COMMENTS:   Sensory  [x]Sensation intact to light touch, pin prick, vibration, and proprioception BLE  []Sensation intact to light touch, pin prick, vibration, and proprioception BUE  COMMENTS:   Coordination [x]FTN normal bilaterally   []HTS normal bilaterally  []DELFIN normal bilaterally.    COMMENTS:   Reflexes  [x]Symmetric and non-pathological  [x]Toes down going bilaterally  [x]No clonus present  COMMENTS:   Gait                  []Normal steady gait    []Ataxic    []Spastic     []Magnetic     []Shuffling  COMMENTS: Cautious, not overtly parkinsonian       LABS RECORD AND IMAGING REVIEW (As below and per HPI)      Lab Results   Component Value Date    WBC 6.30 04/07/2022    HGB 13.5 04/07/2022    HCT 41.0 04/07/2022    MCV 95.3 (H) 04/07/2022  2022     Lab Results   Component Value Date     (L) 2022    K 4.0 2022    CL 96 (L) 2022    CO2 32 (H) 2022    BUN 15 2022    CREATININE 0.8 2022    GLUCOSE 115 (H) 2022    CALCIUM 9.2 2022    PROT 6.3 2022    LABALBU 3.7 2022    BILITOT 0.4 2022    ALKPHOS 105 (H) 2022    AST 40 (H) 2022    ALT 18 2022    LABGLOM >60 2022    GLOB 2.6 2022     CBC, CMP, TSH, B12 normal. Ct head nothing acute. CD without severe stenosis. ASSESSMENT:    Katie Littlejohn is a 80y.o. year old female here for dizziness, memory loss, hallucinations. Suspect underlying dementia. More atypical source such as frontotemporal dementia is not excluded. Lewy body felt less likely given lack of parkinsonism. Dizziness is likely multifactorial.  MRI with , atrophy, nothing acute. EEG normal. Labs including autoimmune encephalitis and paraneoplastic panels negative. Thyroid abs negative. Liver mass noted, biopsy pending currently. PLAN:  1. Will add Namenda titrate to 10 mg bid, consider aricept on down the line. Will add seroquel if hallucinations or behavior worsens also question some underlying pseudobulbar affect as well. Will follow. 2.  Limit Xanax   3. Discussed LP, PET, formal neuropsych testing but will hold off for now. Family would like to see results of the liver biopsy before pursuing more testing here. 4.  Follow up with cardiology as directed  5. Discussed more supervision, medication monitoring, no driving.      Hyacinth Daugherty DO  Board Certified Neurology

## 2022-04-20 ENCOUNTER — HOSPITAL ENCOUNTER (OUTPATIENT)
Dept: INFUSION THERAPY | Age: 82
Discharge: HOME | End: 2022-04-20
Payer: MEDICARE

## 2022-04-20 ENCOUNTER — OFFICE VISIT (OUTPATIENT)
Dept: HEMATOLOGY | Age: 82
End: 2022-04-20
Payer: MEDICARE

## 2022-04-20 VITALS
SYSTOLIC BLOOD PRESSURE: 110 MMHG | HEART RATE: 95 BPM | DIASTOLIC BLOOD PRESSURE: 80 MMHG | BODY MASS INDEX: 15.36 KG/M2 | OXYGEN SATURATION: 99 % | HEIGHT: 64 IN | WEIGHT: 90 LBS

## 2022-04-20 DIAGNOSIS — R16.0 LIVER MASS: Primary | ICD-10-CM

## 2022-04-20 DIAGNOSIS — R11.0 CHEMOTHERAPY-INDUCED NAUSEA: ICD-10-CM

## 2022-04-20 DIAGNOSIS — Z71.89 CARE PLAN DISCUSSED WITH PATIENT: ICD-10-CM

## 2022-04-20 DIAGNOSIS — T45.1X5A CHEMOTHERAPY-INDUCED NAUSEA: ICD-10-CM

## 2022-04-20 DIAGNOSIS — C80.1 SMALL CELL CARCINOMA (HCC): ICD-10-CM

## 2022-04-20 DIAGNOSIS — R16.0 LIVER MASS: ICD-10-CM

## 2022-04-20 DIAGNOSIS — K63.9 COLON WALL THICKENING: ICD-10-CM

## 2022-04-20 LAB
HCT VFR BLD CALC: 41.2 % (ref 34.1–44.9)
HEMOGLOBIN: 13.2 G/DL (ref 11.2–15.7)
MCH RBC QN AUTO: 31 PG (ref 25.6–32.2)
MCHC RBC AUTO-ENTMCNC: 32 G/DL (ref 32.3–35.5)
MCV RBC AUTO: 96.7 FL (ref 79.4–94.8)
PDW BLD-RTO: 14.3 % (ref 11.7–14.4)
PLATELET # BLD: 219 K/UL (ref 182–369)
PMV BLD AUTO: 9.2 FL (ref 7.4–10.4)
RBC # BLD: 4.26 M/UL (ref 3.93–5.22)
WBC # BLD: 7 K/UL (ref 3.98–10.04)

## 2022-04-20 PROCEDURE — 1090F PRES/ABSN URINE INCON ASSESS: CPT | Performed by: INTERNAL MEDICINE

## 2022-04-20 PROCEDURE — G8419 CALC BMI OUT NRM PARAM NOF/U: HCPCS | Performed by: INTERNAL MEDICINE

## 2022-04-20 PROCEDURE — G8427 DOCREV CUR MEDS BY ELIG CLIN: HCPCS | Performed by: INTERNAL MEDICINE

## 2022-04-20 PROCEDURE — 1123F ACP DISCUSS/DSCN MKR DOCD: CPT | Performed by: INTERNAL MEDICINE

## 2022-04-20 PROCEDURE — 99211 OFF/OP EST MAY X REQ PHY/QHP: CPT

## 2022-04-20 PROCEDURE — G8400 PT W/DXA NO RESULTS DOC: HCPCS | Performed by: INTERNAL MEDICINE

## 2022-04-20 PROCEDURE — 1036F TOBACCO NON-USER: CPT | Performed by: INTERNAL MEDICINE

## 2022-04-20 PROCEDURE — 4040F PNEUMOC VAC/ADMIN/RCVD: CPT | Performed by: INTERNAL MEDICINE

## 2022-04-20 PROCEDURE — 99214 OFFICE O/P EST MOD 30 MIN: CPT | Performed by: INTERNAL MEDICINE

## 2022-04-20 PROCEDURE — 85027 COMPLETE CBC AUTOMATED: CPT

## 2022-04-21 DIAGNOSIS — C78.7 SMALL CELL CARCINOMA OF LUNG METASTATIC TO LIVER (HCC): ICD-10-CM

## 2022-04-21 DIAGNOSIS — C34.90 SMALL CELL LUNG CANCER (HCC): ICD-10-CM

## 2022-04-21 DIAGNOSIS — C34.90 SMALL CELL CARCINOMA OF LUNG METASTATIC TO LIVER (HCC): ICD-10-CM

## 2022-04-21 RX ORDER — ONDANSETRON 4 MG/1
4 TABLET, FILM COATED ORAL DAILY PRN
Qty: 30 TABLET | Refills: 0 | Status: SHIPPED | OUTPATIENT
Start: 2022-04-21

## 2022-04-22 ENCOUNTER — TELEPHONE (OUTPATIENT)
Dept: VASCULAR SURGERY | Age: 82
End: 2022-04-22

## 2022-04-22 NOTE — TELEPHONE ENCOUNTER
Left message for patient to call our office back to schedule her Carotid study. We called her last week and she had just had a biopsy and asked that we call her back this week.

## 2022-04-25 ENCOUNTER — TELEPHONE (OUTPATIENT)
Dept: INFUSION THERAPY | Age: 82
End: 2022-04-25

## 2022-04-25 NOTE — TELEPHONE ENCOUNTER
Called and introduced my self to Giorgio robert. Went over her benefits and she was very appreciative for the help with foundation assistance. She also mentioned she was looking for a wig. I told her I would ask around. Also she gave me her daughters name and number for any future communications. Siri Cain [phone].     [facility]  [phone]

## 2022-04-27 DIAGNOSIS — C78.7 SMALL CELL CARCINOMA OF LUNG METASTATIC TO LIVER (HCC): ICD-10-CM

## 2022-04-27 DIAGNOSIS — C34.90 SMALL CELL CARCINOMA OF LUNG METASTATIC TO LIVER (HCC): ICD-10-CM

## 2022-04-27 DIAGNOSIS — C34.90 SMALL CELL LUNG CANCER (HCC): Primary | ICD-10-CM

## 2022-04-27 DIAGNOSIS — R53.0 NEOPLASTIC MALIGNANT RELATED FATIGUE: ICD-10-CM

## 2022-04-28 ENCOUNTER — TELEPHONE (OUTPATIENT)
Dept: INFUSION THERAPY | Age: 82
End: 2022-04-28

## 2022-04-28 NOTE — TELEPHONE ENCOUNTER
Called and introduced myself to . Cr Roche went over the benefits of her Medicare and her having 20% to pay. She agreed to let me sign her up for patient assistance and free Tecentriq. She will come in and sign the application in a few days.       [address] [place] [address] Oncology and Hematology  [phone]

## 2022-04-29 DIAGNOSIS — D09.3 CARCINOMA IN SITU OF THYROID AND OTHER ENDOCRINE GLANDS: ICD-10-CM

## 2022-05-02 ENCOUNTER — TELEPHONE (OUTPATIENT)
Dept: INFUSION THERAPY | Age: 82
End: 2022-05-02

## 2022-05-10 ENCOUNTER — HOSPITAL ENCOUNTER (OUTPATIENT)
Dept: INFUSION THERAPY | Age: 82
Setting detail: INFUSION SERIES
End: 2022-05-10

## 2022-05-10 ENCOUNTER — TELEPHONE (OUTPATIENT)
Dept: INFUSION THERAPY | Age: 82
End: 2022-05-10

## 2022-05-10 NOTE — TELEPHONE ENCOUNTER
Pt was scheduled for chemo infusion 05/10/2022 - 05/12/2022. Pt did not arrive for scheduled appt. Called and spoke with Camron Verma, pts daughter. Ms. Em Hastings stated pt had chosen hospice and will not be proceeding with treatment. Appts cancelled.  Electronically signed by Topher Harris RN on 5/10/2022 at 10:40 AM

## 2022-05-24 ENCOUNTER — HOSPITAL ENCOUNTER (OUTPATIENT)
Dept: INFUSION THERAPY | Age: 82
End: 2022-05-24

## 2023-01-03 NOTE — TELEPHONE ENCOUNTER
Received a call from Thomas Moscoso stating she is impatient at [facility] with \"Impacted bowel\" She stated that her daughter would call to reschedule when she gets discharged.
Canceled

## 2024-03-16 NOTE — TELEPHONE ENCOUNTER
Patient's daughter called back. She states her mother has been recently diagnosed with stage 4 cancer and alzheimer's. She will talk to her mother to see if she wants to precede with tests. She will call the office back and let us know. breast discharge

## (undated) DEVICE — PROXIMATE RH ROTATING HEAD SKIN STAPLERS (35 WIDE) CONTAINS 35 STAINLESS STEEL STAPLES: Brand: PROXIMATE

## (undated) DEVICE — SOLIDIFIER LIQUI LOC PLUS 2000CC

## (undated) DEVICE — CATH DIAG IMPULSE RCB 5F 100CM

## (undated) DEVICE — DRSNG WND SIL OPTIFOAM GENTLE BRDR ADHS W/SA 4X4IN

## (undated) DEVICE — ANTIBACTERIAL UNDYED BRAIDED (POLYGLACTIN 910), SYNTHETIC ABSORBABLE SUTURE: Brand: COATED VICRYL

## (undated) DEVICE — ELECTRD PAD DEFIB A/

## (undated) DEVICE — SPK10183 ORTHOPEDIC FRACTURE AND TRAUMA KIT: Brand: SPK10183 ORTHOPEDIC FRACTURE AND TRAUMA KIT

## (undated) DEVICE — PREP SOL POVIDONE/IODINE BT 4OZ

## (undated) DEVICE — GW FOR TROCH NAIL 3.2X400MM

## (undated) DEVICE — GLV SURG DERMASSURE GRN LF PF 8.0

## (undated) DEVICE — CATH DIAG IMPULSE LCB 5F 100CM

## (undated) DEVICE — GW 3.2X475MM

## (undated) DEVICE — MODEL AT P65, P/N 701554-001KIT CONTENTS: HAND CONTROLLER, 3-WAY HIGH-PRESSURE STOPCOCK WITH ROTATING END AND PREMIUM HIGH-PRESSURE TUBING: Brand: ANGIOTOUCH® KIT

## (undated) DEVICE — 4-PORT MANIFOLD: Brand: NEPTUNE 2

## (undated) DEVICE — BIT DRL 3FLUT QC CALIB 4.2X330X100MM

## (undated) DEVICE — GW STARTER FXD CORE J .035 3X150CM 3MM

## (undated) DEVICE — SOL IRR NACL 0.9PCT BT 1000ML

## (undated) DEVICE — PINNACLE INTRODUCER SHEATH: Brand: PINNACLE

## (undated) DEVICE — Device

## (undated) DEVICE — GLV SURG TRIUMPH PF LTX 7.5 STRL

## (undated) DEVICE — MODEL BT2000 P/N 700287-012KIT CONTENTS: MANIFOLD WITH SALINE AND CONTRAST PORTS, SALINE TUBING WITH SPIKE AND HAND SYRINGE, TRANSDUCER: Brand: BT2000 AUTOMATED MANIFOLD KIT

## (undated) DEVICE — DRSNG BRDR MEPILEXLITE SLFADHR SIL 2X5

## (undated) DEVICE — CATH DIAG IMPULSE M/ PK 145 5FR

## (undated) DEVICE — CANN CO2/O2 NASL A/

## (undated) DEVICE — A2000 MULTI-USE SYRINGE KIT, P/N 701277-003KIT CONTENTS: 100ML CONTRAST RESERVOIR AND TUBING WITH CONTRAST SPIKE AND CLAMP: Brand: A2000 MULTI-USE SYRINGE KIT

## (undated) DEVICE — PK CATH CARD 30

## (undated) DEVICE — PK HIP ORIF FX TABL 30

## (undated) DEVICE — PK TURNOVER RM ADV

## (undated) DEVICE — ANGIO-SEAL VIP VASCULAR CLOSURE DEVICE: Brand: ANGIO-SEAL